# Patient Record
Sex: MALE | Race: BLACK OR AFRICAN AMERICAN | NOT HISPANIC OR LATINO | Employment: STUDENT | URBAN - METROPOLITAN AREA
[De-identification: names, ages, dates, MRNs, and addresses within clinical notes are randomized per-mention and may not be internally consistent; named-entity substitution may affect disease eponyms.]

---

## 2017-02-20 ENCOUNTER — HOSPITAL ENCOUNTER (EMERGENCY)
Facility: OTHER | Age: 37
Discharge: HOME | End: 2017-02-21
Attending: EMERGENCY MEDICINE
Payer: MEDICAID

## 2017-02-20 DIAGNOSIS — T14.90XA TRAUMA: ICD-10-CM

## 2017-02-20 DIAGNOSIS — M23.92 KNEE DERANGEMENT, LEFT: Primary | ICD-10-CM

## 2017-02-20 DIAGNOSIS — I10 ESSENTIAL HYPERTENSION: ICD-10-CM

## 2017-02-20 PROCEDURE — 99283 EMERGENCY DEPT VISIT LOW MDM: CPT | Mod: 25

## 2017-02-20 PROCEDURE — 29505 APPLICATION LONG LEG SPLINT: CPT | Mod: LT

## 2017-02-20 PROCEDURE — 25000003 PHARM REV CODE 250: Performed by: EMERGENCY MEDICINE

## 2017-02-20 RX ORDER — LISINOPRIL 10 MG/1
10 TABLET ORAL
Status: COMPLETED | OUTPATIENT
Start: 2017-02-20 | End: 2017-02-20

## 2017-02-20 RX ORDER — HYDROCODONE BITARTRATE AND ACETAMINOPHEN 7.5; 325 MG/1; MG/1
1 TABLET ORAL EVERY 8 HOURS PRN
Qty: 19 TABLET | Refills: 0 | Status: SHIPPED | OUTPATIENT
Start: 2017-02-20 | End: 2019-07-24

## 2017-02-20 RX ORDER — KETOROLAC TROMETHAMINE 30 MG/ML
30 INJECTION, SOLUTION INTRAMUSCULAR; INTRAVENOUS
Status: COMPLETED | OUTPATIENT
Start: 2017-02-20 | End: 2017-02-21

## 2017-02-20 RX ADMIN — LISINOPRIL 10 MG: 10 TABLET ORAL at 10:02

## 2017-02-20 NOTE — ED AVS SNAPSHOT
[facility] EMERGENCY DEPARTMENT  [address] [place] [address]               Nabil Alfonso   2017 10:56 PM   ED    Description:  Male : 1980   Department:  [facility]           Your Care was Coordinated By:     Provider Role From To    Ayaka Zavaleta MD Attending Provider 17 2683 --      Reason for Visit     Leg Pain           Diagnoses this Visit        Comments    Knee derangement, left    -  Primary     Trauma         Essential hypertension           ED Disposition     ED Disposition Condition Comment    Discharge             To Do List           Follow-up Information     Follow up with Orthopedic Surgery.    Why:  Contact the [facility]erral Line at [phone] for assistance in establishing care with an orthopedic surgeon.        Follow up with Hira Randhawa MD.    Specialty:  Orthopedic Surgery    Contact information:    [address]  [address]  [place] [id number] [phone]         These Medications        Disp Refills Start End    hydrocodone-acetaminophen 7.5-325mg (NORCO) 7.5-325 mg per tablet 19 tablet 0 2017     Take 1 tablet by mouth every 8 (eight) hours as needed. - Oral      [facility]     [facility] -  Assistance  Registered nurses in the [facility] provide clinical advisement, health education, appointment booking, and other advisory services.  Call for this free service at [phone].             Medications           Message regarding Medications     Verify the changes and/or additions to your medication regime listed below are the same as discussed with your clinician today.  If any of these changes or additions are incorrect, please notify your healthcare provider.        START taking these NEW medications        Refills    hydrocodone-acetaminophen 7.5-325mg (NORCO) 7.5-325 mg per tablet 0    Sig: Take 1 tablet by mouth every 8 (eight) hours as needed.    Class:  Print    Route: Oral      These medications were administered today        Dose Freq    lisinopril tablet 10 mg 10 mg ED 1 Time    Sig: Take 1 tablet (10 mg total) by mouth ED 1 Time.    Class: Normal    Route: Oral    ketorolac injection 30 mg 30 mg ED 1 Time    Sig: Inject 30 mg into the muscle ED 1 Time.    Class: Normal    Route: Intramuscular           Verify that the below list of medications is an accurate representation of the medications you are currently taking.  If none reported, the list may be blank. If incorrect, please contact your healthcare provider. Carry this list with you in case of emergency.           Current Medications     amlodipine (NORVASC) 5 MG tablet Take 5 mg by mouth once daily.    hydrocodone-acetaminophen 7.5-325mg (NORCO) 7.5-325 mg per tablet Take 1 tablet by mouth every 8 (eight) hours as needed.    ibuprofen (ADVIL,MOTRIN) 600 MG tablet Take 1 tablet (600 mg total) by mouth every 6 (six) hours as needed for Pain.    ketorolac injection 30 mg Inject 30 mg into the muscle ED 1 Time.    lisinopril (PRINIVIL,ZESTRIL) 20 MG tablet Take 20 mg by mouth once daily.    lisinopril 10 MG tablet Take 10 mg by mouth once daily.           Clinical Reference Information           Your Vitals Were     BP Pulse Temp Resp SpO2       181/106 70 97.9 °F (36.6 °C) 18 98%       Allergies as of 2/20/2017     No Known Allergies      Immunizations Administered on Date of Encounter - 2/20/2017     None      ED Micro, Lab, POCT     None      ED Imaging Orders     Start Ordered       Status Ordering Provider    02/20/17 2158 02/20/17 2157  X-Ray Knee 1 or 2 View Left  1 time imaging      Edited Result - FINAL     02/20/17 2158 02/20/17 2157  X-Ray Tibia Fibula 2 View Left  1 time imaging      Final result         Discharge Instructions       Wear knee immobilizer and use crutches until you can follow-up with orthopedic surgeon.  You can contact your primary care provider for referral to orthopedics.  You  can contact the [facility] referral line at [phone] for assistance in establishing an orthopedic surgery follow-up.  Or [facility] Dr. Randhawa, by calling [phone].  Make sure to take your blood pressure medicine every day as previously prescribed, and follow low sodium diet.    Knee Pain with Possible Torn Meniscus    The meniscus is a tough cartilage pad that cushions the inside of the knee joint. It helps absorb the shock from movement. It also spreads the weight of your body evenly across the knee joint. This prevents excess wear and tear to the bones of that joint.  The most common causes of meniscal tears are injuries, especially related to sports and degenerative disease that happens with aging.  A meniscus tear commonly happens during a twisting injury when the knee is bent. This causes pain, swelling, reduced movement of the knee, and trouble walking. There may be popping, clicking, joint locking or inability to completely straighten the knee. Ligaments of the knee may also be injured.  A torn meniscus is diagnosed by physical exam and X-rays. In the case of a severe injury, the knee may be too painful to examine fully. A more accurate exam can be done after the initial swelling goes down. An MRI may be ordered to make a final diagnosis.  If your healthcare provider suspects a meniscal injury, you will treat your knee with ice and rest and preventing movement of the knee. A splint or knee brace that keeps your leg straight may be put on to protect the joint. Depending on the severity of the injury, surgery may be needed. A cartilage injury may take 4-12 weeks to heal depending on how bad it is.  Home care  · Stay off the injured leg as much as possible until you can walk on it without pain. If you have a lot of pain when walking, crutches or a walker may be prescribed. (These can be rented or bought at many pharmacies and surgical or orthopedic  supply stores). Follow your healthcare provider's advice about when to begin putting weight on that leg.  · Keep your leg elevated to reduce pain and swelling. When sleeping, place a pillow under the injured leg. When sitting, support the injured leg so it is level with your waist. This is very important during the first 48 hours.  · Apply an ice pack over the injured area for 15 to 20 minutes every 3 to 6 hours. You should do this for the first 24 to 48 hours. You can make an ice pack by filling a plastic bag that seals at the top with ice cubes and then wrapping it with a thin towel. Continue to use ice packs for relief of pain and swelling as needed. As the ice melts, be careful to avoid getting your wrap, splint, or cast wet. After 48 hours, apply heat (warm shower or warm bath) for 15 to 20 minutes several times a day, or alternate ice and heat. You can place the ice pack directly over the splint. If you have to wear a hook-and-loop knee brace, you can open it to apply the ice pack, or heat, directly to the knee. Never put ice directly on the skin. Always wrap the ice in a towel or other type of cloth.  · You may use over-the-counter pain medicine to control pain, unless another pain medicine was prescribed. If you have chronic liver or kidney disease or ever had a stomach ulcer, talk with your healthcare provider before using these medicines.  · If you were given a splint, keep it dry at all times. Bathe with your splint out of the water. Protect it with a large plastic bag that is rubber-banded at the top end. If a fiberglass splint gets wet, you can dry it with a hair dryer. If you have a hook-and-loop knee brace, you can remove this to bathe, unless told otherwise.  · Check with your healthcare provider before returning to sports or full work duties.  Follow-up care  Follow up with your healthcare provider, or as advised. This is usually within 1-2 weeks. Further testing may be required to check the extent  of your injury.  If X-rays were taken, you will be told of any new findings that may affect your care.  Call 911  Call 911 if you have:   · Shortness of breath  · Chest pain  When to seek medical advice  Call your healthcare provider right away if any of these occur:  · Toes or foot gets swollen, cold, blue, numb, or tingly  · Pain or swelling spreads over the knee or calf  · Warmth or redness appears over the knee or calf  · Fever of 100.4°F (38°C) or above lasting for 24 to 48 hours  Date Last Reviewed: 11/23/2015  © 3518-7556 [employer]. [address] [place] [address]. All rights reserved. This information is not intended as a substitute for professional medical care. Always follow your healthcare professional's instructions.          Knee Immobilizer  A knee immobilizer is a type of brace used to provide support and limit movement of the knee. This will make you more comfortable as your injury heals.  Home care  · The knee brace should be worn whenever you are out of bed, unless told otherwise. You may wear it in bed while asleep for the first few nights or until the pain starts to go away. Otherwise, remove the brace at night to avoid muscle stiffness from lack of joint movement.  · You can open the hook-and-loop brace to dress, bathe, and apply ice or heat packs as directed.  Call 911  Call 911 if you have:  · Shortness of breath  · Chest pain  When to seek medical advice  Call your healthcare provider right away if any of these occur:  · Worsening pain in the knee  · Weakness, numbness, or tingling in the foot  · Increased swelling, redness or warmth of the knee joint  Date Last Reviewed: 11/21/2015 © 2000-2016 [employer]. [address] [place] [address]. All rights reserved. This information is not intended as a substitute for professional medical care. Always follow your healthcare professional's instructions.          [facility] Sign-Up     Activating your  [facility] account is as easy as 1-2-3!     1) Visit [email or web address], select Sign Up Now, enter this activation code and your date of birth, then select Next.  [email or web address]  Expires: 4/6/2017 11:54 PM      2) Create a username and password to use when you visit [facility] in the future and select a security question in case you lose your password and select Next.    3) Enter your e-mail address and click Sign Up!    Additional Information  If you have questions, please e-mail [email or web address] or call [phone] to talk to our [facility] staff. Remember, [facility] is NOT to be used for urgent needs. For medical emergencies, dial 911.         Smoking Cessation     If you would like to quit smoking:   You may be eligible for free services if you are a [place] resident and started smoking cigarettes before September 1, 1988.  Call the Smoking Cessation Trust ([facility]) toll free at ([phone] or ([phone] [phone].   Call 8-484-QUIT-NOW if you do not meet the above criteria.             [facility] complies with applicable Federal civil rights laws and does not discriminate on the basis of race, color, national origin, age, disability, or sex.        Language Assistance Services     ATTENTION: Language assistance services are available, free of charge. Please call [phone].      ATENCIÓN: Si habla español, tiene a medina disposición servicios gratuitos de asistencia lingüística. Llame al [phone].     CHÚ Ý: N?u b?n nói Ti?ng Vi?t, có các d?ch v? h? tr? ngôn ng? mi?n phí dành cho b?n. G?i s? [phone].

## 2017-02-21 VITALS
RESPIRATION RATE: 18 BRPM | DIASTOLIC BLOOD PRESSURE: 103 MMHG | OXYGEN SATURATION: 99 % | TEMPERATURE: 98 F | SYSTOLIC BLOOD PRESSURE: 173 MMHG | HEART RATE: 69 BPM

## 2017-02-21 PROCEDURE — 63600175 PHARM REV CODE 636 W HCPCS: Performed by: EMERGENCY MEDICINE

## 2017-02-21 PROCEDURE — 29505 APPLICATION LONG LEG SPLINT: CPT | Mod: LT

## 2017-02-21 PROCEDURE — 96372 THER/PROPH/DIAG INJ SC/IM: CPT

## 2017-02-21 RX ADMIN — KETOROLAC TROMETHAMINE 30 MG: 30 INJECTION, SOLUTION INTRAMUSCULAR at 12:02

## 2017-02-21 NOTE — ED PROVIDER NOTES
Encounter Date: 2/20/2017       History     Chief Complaint   Patient presents with    Leg Pain     L knee pain/shin s/p fall and toolbox injury. Ambulatory but with difficulty.      Review of patient's allergies indicates:  No Known Allergies  HPI Comments: 36-year-old male reports he slipped earlier today and landed on his knee.  He reports bruising and pain to the shin but mostly pain to the medial aspect of his knee and difficulty bearing weight.  Patient is noted to be significant only hypertensive.  His reports noncompliance with his lisinopril.  He denies headache, weakness, chest pain, shortness of breath, nausea,and leg swelling.    Patient is a 36 y.o. male presenting with the following complaint: leg pain. The history is provided by the patient.   Leg Pain    Pertinent negatives include no numbness.     Past Medical History   Diagnosis Date    Hypertension      Past Medical History Pertinent Negatives   Diagnosis Date Noted    Asthma 8/4/2016    Diabetes mellitus 8/4/2016     Past Surgical History   Procedure Laterality Date    Achilles tendon surgery      Hernia repair       Family History   Problem Relation Age of Onset    Hypertension Mother     Hypertension Father      Social History   Substance Use Topics    Smoking status: Former Smoker     Packs/day: 0.50     Types: Cigarettes    Smokeless tobacco: None    Alcohol use Yes      Comment: occasionally     Review of Systems   Constitutional: Negative for chills and fever.   Respiratory: Negative for cough and shortness of breath.    Cardiovascular: Negative for chest pain and palpitations.   Gastrointestinal: Negative for nausea.   Musculoskeletal: Positive for arthralgias and gait problem.   Skin: Positive for wound. Negative for color change.   Neurological: Negative for weakness, numbness and headaches.       Physical Exam   Initial Vitals   BP Pulse Resp Temp SpO2   02/20/17 2152 02/20/17 2152 02/20/17 2152 02/20/17 2152 02/20/17 2152    214/127 77 18 97.9 °F (36.6 °C) 98 %     Physical Exam    Nursing note and vitals reviewed.  Constitutional: He appears well-developed and well-nourished. He is cooperative.   HENT:   Head: Normocephalic and atraumatic.   Cardiovascular: Normal rate and regular rhythm.   No pitting edema to bilateral lower extremities   Pulmonary/Chest: Effort normal and breath sounds normal.   Abdominal: Soft. There is no tenderness.   Musculoskeletal:        Right knee: Normal.        Left knee: He exhibits decreased range of motion, swelling and effusion. He exhibits no ecchymosis. Tenderness found. MCL tenderness noted.        Legs:  Neurological: He is alert and oriented to person, place, and time. He has normal strength. No sensory deficit.   Sensation is intact bilateral lower extremities.  Strength5/5 bilateral flexors/extensors of the ankles   Skin: Skin is warm and dry. Abrasion noted.         ED Course   Procedures  Labs Reviewed - No data to display         x-rays of the tib-fib and knee were unremarkable.  Patient is placed in a knee immobilizer, given crutches, Norco for pain and advised follow-up with orthopedics possible internal derangement of the knee mainly in the medial collateral ligament injury.                    ED Course     Clinical Impression:   The primary encounter diagnosis was Knee derangement, left. Diagnoses of Trauma and Essential hypertension were also pertinent to this visit.    Disposition:   Disposition: Discharged  Condition: Stable       Ayaka Zavaleta MD  02/21/17 0314

## 2017-02-21 NOTE — DISCHARGE INSTRUCTIONS
Wear knee immobilizer and use crutches until you can follow-up with orthopedic surgeon.  You can contact your primary care provider for referral to orthopedics.  You can contact the [facility] referral line at [phone] for assistance in establishing an orthopedic surgery follow-up.  Or [facility], Dr. Randhawa, by calling [phone].  Make sure to take your blood pressure medicine every day as previously prescribed, and follow low sodium diet.    Knee Pain with Possible Torn Meniscus    The meniscus is a tough cartilage pad that cushions the inside of the knee joint. It helps absorb the shock from movement. It also spreads the weight of your body evenly across the knee joint. This prevents excess wear and tear to the bones of that joint.  The most common causes of meniscal tears are injuries, especially related to sports and degenerative disease that happens with aging.  A meniscus tear commonly happens during a twisting injury when the knee is bent. This causes pain, swelling, reduced movement of the knee, and trouble walking. There may be popping, clicking, joint locking or inability to completely straighten the knee. Ligaments of the knee may also be injured.  A torn meniscus is diagnosed by physical exam and X-rays. In the case of a severe injury, the knee may be too painful to examine fully. A more accurate exam can be done after the initial swelling goes down. An MRI may be ordered to make a final diagnosis.  If your healthcare provider suspects a meniscal injury, you will treat your knee with ice and rest and preventing movement of the knee. A splint or knee brace that keeps your leg straight may be put on to protect the joint. Depending on the severity of the injury, surgery may be needed. A cartilage injury may take 4-12 weeks to heal depending on how bad it is.  Home care  · Stay off the injured leg as much as possible until you can walk on it without  pain. If you have a lot of pain when walking, crutches or a walker may be prescribed. (These can be rented or bought at many pharmacies and surgical or orthopedic supply stores). Follow your healthcare provider's advice about when to begin putting weight on that leg.  · Keep your leg elevated to reduce pain and swelling. When sleeping, place a pillow under the injured leg. When sitting, support the injured leg so it is level with your waist. This is very important during the first 48 hours.  · Apply an ice pack over the injured area for 15 to 20 minutes every 3 to 6 hours. You should do this for the first 24 to 48 hours. You can make an ice pack by filling a plastic bag that seals at the top with ice cubes and then wrapping it with a thin towel. Continue to use ice packs for relief of pain and swelling as needed. As the ice melts, be careful to avoid getting your wrap, splint, or cast wet. After 48 hours, apply heat (warm shower or warm bath) for 15 to 20 minutes several times a day, or alternate ice and heat. You can place the ice pack directly over the splint. If you have to wear a hook-and-loop knee brace, you can open it to apply the ice pack, or heat, directly to the knee. Never put ice directly on the skin. Always wrap the ice in a towel or other type of cloth.  · You may use over-the-counter pain medicine to control pain, unless another pain medicine was prescribed. If you have chronic liver or kidney disease or ever had a stomach ulcer, talk with your healthcare provider before using these medicines.  · If you were given a splint, keep it dry at all times. Bathe with your splint out of the water. Protect it with a large plastic bag that is rubber-banded at the top end. If a fiberglass splint gets wet, you can dry it with a hair dryer. If you have a hook-and-loop knee brace, you can remove this to bathe, unless told otherwise.  · Check with your healthcare provider before returning to sports or full work  duties.  Follow-up care  Follow up with your healthcare provider, or as advised. This is usually within 1-2 weeks. Further testing may be required to check the extent of your injury.  If X-rays were taken, you will be told of any new findings that may affect your care.  Call 911  Call 911 if you have:   · Shortness of breath  · Chest pain  When to seek medical advice  Call your healthcare provider right away if any of these occur:  · Toes or foot gets swollen, cold, blue, numb, or tingly  · Pain or swelling spreads over the knee or calf  · Warmth or redness appears over the knee or calf  · Fever of 100.4°F (38°C) or above lasting for 24 to 48 hours  Date Last Reviewed: 11/23/2015 © 2000-2016 [employer]. [address] [place] [address]. All rights reserved. This information is not intended as a substitute for professional medical care. Always follow your healthcare professional's instructions.          Knee Immobilizer  A knee immobilizer is a type of brace used to provide support and limit movement of the knee. This will make you more comfortable as your injury heals.  Home care  · The knee brace should be worn whenever you are out of bed, unless told otherwise. You may wear it in bed while asleep for the first few nights or until the pain starts to go away. Otherwise, remove the brace at night to avoid muscle stiffness from lack of joint movement.  · You can open the hook-and-loop brace to dress, bathe, and apply ice or heat packs as directed.  Call 911  Call 911 if you have:  · Shortness of breath  · Chest pain  When to seek medical advice  Call your healthcare provider right away if any of these occur:  · Worsening pain in the knee  · Weakness, numbness, or tingling in the foot  · Increased swelling, redness or warmth of the knee joint  Date Last Reviewed: 11/21/2015 © 2000-2016 [employer]. [address] [place] [address]. All rights reserved. This information  is not intended as a substitute for professional medical care. Always follow your healthcare professional's instructions.

## 2017-09-21 ENCOUNTER — OFFICE VISIT (OUTPATIENT)
Dept: URGENT CARE | Facility: CLINIC | Age: 37
End: 2017-09-21
Payer: MEDICAID

## 2017-09-21 VITALS
HEART RATE: 71 BPM | TEMPERATURE: 98 F | DIASTOLIC BLOOD PRESSURE: 96 MMHG | HEIGHT: 67 IN | OXYGEN SATURATION: 99 % | SYSTOLIC BLOOD PRESSURE: 151 MMHG | BODY MASS INDEX: 31.39 KG/M2 | WEIGHT: 200 LBS

## 2017-09-21 DIAGNOSIS — J01.90 ACUTE NON-RECURRENT SINUSITIS, UNSPECIFIED LOCATION: Primary | ICD-10-CM

## 2017-09-21 DIAGNOSIS — R50.9 FEVER, UNSPECIFIED FEVER CAUSE: ICD-10-CM

## 2017-09-21 LAB
CTP QC/QA: YES
FLUAV AG NPH QL: NEGATIVE
FLUBV AG NPH QL: NEGATIVE

## 2017-09-21 PROCEDURE — 87804 INFLUENZA ASSAY W/OPTIC: CPT | Mod: QW,S$GLB,, | Performed by: FAMILY MEDICINE

## 2017-09-21 PROCEDURE — 3080F DIAST BP >= 90 MM HG: CPT | Mod: S$GLB,,, | Performed by: FAMILY MEDICINE

## 2017-09-21 PROCEDURE — 3008F BODY MASS INDEX DOCD: CPT | Mod: S$GLB,,, | Performed by: FAMILY MEDICINE

## 2017-09-21 PROCEDURE — 3077F SYST BP >= 140 MM HG: CPT | Mod: S$GLB,,, | Performed by: FAMILY MEDICINE

## 2017-09-21 PROCEDURE — 99214 OFFICE O/P EST MOD 30 MIN: CPT | Mod: 25,S$GLB,, | Performed by: FAMILY MEDICINE

## 2017-09-21 RX ORDER — AZITHROMYCIN 250 MG/1
250 TABLET, FILM COATED ORAL DAILY
Qty: 6 TABLET | Refills: 0 | Status: SHIPPED | OUTPATIENT
Start: 2017-09-21 | End: 2019-07-24

## 2017-09-21 RX ORDER — LISINOPRIL AND HYDROCHLOROTHIAZIDE 12.5; 2 MG/1; MG/1
TABLET ORAL
COMMUNITY
Start: 2017-08-21 | End: 2019-07-24

## 2017-09-21 RX ORDER — CODEINE PHOSPHATE AND GUAIFENESIN 10; 100 MG/5ML; MG/5ML
10 SOLUTION ORAL NIGHTLY PRN
Qty: 120 ML | Refills: 0 | Status: SHIPPED | OUTPATIENT
Start: 2017-09-21 | End: 2017-10-05

## 2017-09-21 RX ORDER — DEXAMETHASONE SODIUM PHOSPHATE 100 MG/10ML
7 INJECTION INTRAMUSCULAR; INTRAVENOUS ONCE
Status: COMPLETED | OUTPATIENT
Start: 2017-09-21 | End: 2017-09-21

## 2017-09-21 RX ORDER — HYDROCODONE BITARTRATE AND ACETAMINOPHEN 5; 325 MG/1; MG/1
TABLET ORAL
COMMUNITY
Start: 2017-09-02 | End: 2019-07-24

## 2017-09-21 RX ADMIN — DEXAMETHASONE SODIUM PHOSPHATE 7 MG: 100 INJECTION INTRAMUSCULAR; INTRAVENOUS at 09:09

## 2017-09-21 NOTE — LETTER
September 21, 2017      [facility] [address], [address]  [place] [address]  Phone: [phone]  Fax: [phone]       Patient: Nabil Alfonso   YOB: 1980  Date of Visit: 09/21/2017    To Whom It May Concern:    Maite Alfonso  was at [facility] on 09/21/2017. He may return to work/school on 9/22/17 with no restrictions. If you have any questions or concerns, or if I can be of further assistance, please do not hesitate to contact me.    Sincerely,    Woody Hogan MD

## 2017-09-21 NOTE — PATIENT INSTRUCTIONS
Sinusitis (Antibiotic Treatment)    The sinuses are air-filled spaces within the bones of the face. They connect to the inside of the nose. Sinusitis is an inflammation of the tissue lining the sinus cavity. Sinus inflammation can occur during a cold. It can also be due to allergies to pollens and other particles in the air. Sinusitis can cause symptoms of sinus congestion and fullness. A sinus infection causes fever, headache and facial pain. There is often green or yellow drainage from the nose or into the back of the throat (post-nasal drip). You have been given antibiotics to treat this condition.  Home care:  · Take the full course of antibiotics as instructed. Do not stop taking them, even if you feel better.  · Drink plenty of water, hot tea, and other liquids. This may help thin mucus. It also may promote sinus drainage.  · Heat may help soothe painful areas of the face. Use a towel soaked in hot water. Or,  the shower and direct the hot spray onto your face. Using a vaporizer along with a menthol rub at night may also help.   · An expectorant containing guaifenesin may help thin the mucus and promote drainage from the sinuses.  · Over-the-counter decongestants may be used unless a similar medicine was prescribed. Nasal sprays work the fastest. Use one that contains phenylephrine or oxymetazoline. First blow the nose gently. Then use the spray. Do not use these medicines more often than directed on the label or symptoms may get worse. You may also use tablets containing pseudoephedrine. Avoid products that combine ingredients, because side effects may be increased. Read labels. You can also ask the pharmacist for help. (NOTE: Persons with high blood pressure should not use decongestants. They can raise blood pressure.)  · Over-the-counter antihistamines may help if allergies contributed to your sinusitis.    · Do not use nasal rinses or irrigation during an acute sinus infection, unless told to by  your health care provider. Rinsing may spread the infection to other sinuses.  · Use acetaminophen or ibuprofen to control pain, unless another pain medicine was prescribed. (If you have chronic liver or kidney disease or ever had a stomach ulcer, talk with your doctor before using these medicines. Aspirin should never be used in anyone under 18 years of age who is ill with a fever. It may cause severe liver damage.)  · Don't smoke. This can worsen symptoms.  Follow-up care  Follow up with your healthcare provider or our staff if you are not improving within the next week.  When to seek medical advice  Call your healthcare provider if any of these occur:  · Facial pain or headache becoming more severe  · Stiff neck  · Unusual drowsiness or confusion  · Swelling of the forehead or eyelids  · Vision problems, including blurred or double vision  · Fever of 100.4ºF (38ºC) or higher, or as directed by your healthcare provider  · Seizure  · Breathing problems  · Symptoms not resolving within 10 days  Date Last Reviewed: 4/13/2015  © 2339-8035 [employer]. [address] [place] [address], [place] [address]. All rights reserved. This information is not intended as a substitute for professional medical care. Always follow your healthcare professional's instructions.

## 2017-09-21 NOTE — PROGRESS NOTES
"Subjective:       Patient ID: Nabil Alfonso is a 37 y.o. male.    Vitals:  height is 5' 7" (1.702 m) and weight is 90.7 kg (200 lb). His oral temperature is 97.8 °F (36.6 °C). His blood pressure is 151/96 (abnormal) and his pulse is 71. His oxygen saturation is 99%.     Chief Complaint: Cough and Emesis    URI    This is a new problem. The current episode started in the past 7 days. The problem has been unchanged. There has been no fever. Associated symptoms include coughing, diarrhea, a sore throat and vomiting. Pertinent negatives include no abdominal pain, chest pain, dysuria or nausea.     Review of Systems   Constitution: Negative for chills and fever.   HENT: Positive for sore throat.    Cardiovascular: Negative for chest pain.   Respiratory: Positive for cough. Negative for shortness of breath.    Musculoskeletal: Negative for back pain.   Gastrointestinal: Positive for diarrhea and vomiting. Negative for abdominal pain, constipation, hematochezia, melena and nausea.   Genitourinary: Negative for dysuria.       Objective:      Physical Exam   Constitutional: He is oriented to person, place, and time. He appears well-developed and well-nourished. He appears ill.   HENT:   Head: Normocephalic and atraumatic.   Nose: Mucosal edema, rhinorrhea and sinus tenderness present. Right sinus exhibits maxillary sinus tenderness and frontal sinus tenderness.   Mouth/Throat: Posterior oropharyngeal edema and posterior oropharyngeal erythema present.   Eyes: Conjunctivae and EOM are normal. Pupils are equal, round, and reactive to light.   Neck: Normal range of motion.   Cardiovascular: Normal rate, regular rhythm and normal heart sounds.    Pulmonary/Chest: Effort normal and breath sounds normal. He has no wheezes. He has no rales.   Neurological: He is alert and oriented to person, place, and time.       Assessment:       1. Acute non-recurrent sinusitis, unspecified location    2. Fever, unspecified fever cause      "   Plan:         Acute non-recurrent sinusitis, unspecified location  -     dexamethasone injection 7 mg; Inject 0.7 mLs (7 mg total) into the muscle once.  -     azithromycin (Z-PENELOPE) 250 MG tablet; Take 1 tablet (250 mg total) by mouth once daily. Double dose on day #1  Dispense: 6 tablet; Refill: 0  -     guaifenesin-codeine 100-10 mg/5 ml (CHERATUSSIN AC)  mg/5 mL syrup; Take 10 mLs by mouth nightly as needed for Cough.  Dispense: 120 mL; Refill: 0    Fever, unspecified fever cause  -     POCT Influenza A/B

## 2018-10-15 ENCOUNTER — OFFICE VISIT (OUTPATIENT)
Dept: URGENT CARE | Facility: CLINIC | Age: 38
End: 2018-10-15
Payer: MEDICAID

## 2018-10-15 VITALS
HEIGHT: 67 IN | WEIGHT: 199 LBS | RESPIRATION RATE: 16 BRPM | OXYGEN SATURATION: 99 % | DIASTOLIC BLOOD PRESSURE: 102 MMHG | HEART RATE: 57 BPM | SYSTOLIC BLOOD PRESSURE: 172 MMHG | TEMPERATURE: 98 F | BODY MASS INDEX: 31.23 KG/M2

## 2018-10-15 DIAGNOSIS — I10 ESSENTIAL HYPERTENSION: ICD-10-CM

## 2018-10-15 DIAGNOSIS — M79.672 ACUTE PAIN OF LEFT FOOT: ICD-10-CM

## 2018-10-15 DIAGNOSIS — M72.2 PLANTAR FASCIITIS, LEFT: ICD-10-CM

## 2018-10-15 DIAGNOSIS — M77.32 HEEL SPUR, LEFT: Primary | ICD-10-CM

## 2018-10-15 PROCEDURE — 73630 X-RAY EXAM OF FOOT: CPT | Mod: LT,S$GLB,, | Performed by: RADIOLOGY

## 2018-10-15 PROCEDURE — 99214 OFFICE O/P EST MOD 30 MIN: CPT | Mod: S$GLB,,, | Performed by: NURSE PRACTITIONER

## 2018-10-15 RX ORDER — AMLODIPINE BESYLATE 10 MG/1
TABLET ORAL
Refills: 6 | COMMUNITY
Start: 2018-09-17 | End: 2019-07-24

## 2018-10-15 RX ORDER — IBUPROFEN 600 MG/1
600 TABLET ORAL EVERY 6 HOURS PRN
Qty: 60 TABLET | Refills: 2 | Status: SHIPPED | OUTPATIENT
Start: 2018-10-15 | End: 2019-07-24

## 2018-10-15 RX ORDER — MUPIROCIN 20 MG/G
OINTMENT TOPICAL
Refills: 0 | COMMUNITY
Start: 2018-09-18 | End: 2019-07-24

## 2018-10-15 NOTE — PATIENT INSTRUCTIONS
Please drink plenty of fluids.  Please get plenty of rest.  Please return here or go to the Emergency Department for any concerns or worsening of condition.  If you were prescribed a narcotic medication, do not drive or operate heavy equipment or machinery while taking these medications.  If you were not prescribed an anti-inflammatory medication, and if you do not have any history of stomach/intestinal ulcers, or kidney disease, or are not taking a blood thinner such as Coumadin, Plavix, Pradaxa, Eloquis, or Xaralta for example, it is OK to take over the counter Ibuprofen or Advil or Motrin or Aleve as directed.  Do not take these medications on an empty stomach.  Rest, ice, compression and elevation to the affected joint or limb as needed.  Please follow up with your primary care doctor or specialist as needed.    If you  smoke, please stop smoking.    Heel Spurs    The plantar fascia is a thick, fibrous layer of tissue that covers the bones on the bottom of your foot. It holds the foot bones in an arched position. Plantar fasciitis is a painful swelling of the plantar fascia.  A heel spur is an overgrowth of bone where the plantar fascia attaches to the heel bone. The heel spur itself usually doesnt cause pain. However, the heel spur might be a sign of plantar fasciitis which may cause your foot pain. There is no specific treatment for heel spurs.   Plantar fasciitis can develop slowly or suddenly. It usually affects one foot at a time. Heel pain can feel sharp, like a knife sticking into the bottom of your foot. You may feel pain after exercising, long-distance jogging, stair climbing, long periods of standing, or after standing up.  Risk factors for plantar fasciitis include: arthritis, diabetes, obesity or recent weight gain, flat foot, and having high arches. Wearing high heels, loose shoes, or shoes with poor arch support adds to the risk.    Foot pain is usually worse in the morning. But it improves with  walking. By the end of the day there may be a dull aching. Treatment includes short-term rest and controlling inflammation. It may take up to 9 months before all symptoms go away. In rare cases, a steroid injection in the foot, or surgery, may be needed.  Home care  · If you are overweight, lose weight to help healing.  · Choose supportive shoes with good arch support and shock absorbency. Replace athletic shoes when they become worn out. Dont walk or run barefoot.  · Premade or custom-fitted shoe inserts may be helpful. Inserts made of silicone seem to be the most effective. Custom-made inserts can be provided by a podiatrist or foot specialist, physical therapist, or orthopedist.  · Premade or custom-made night splints keep the heel stretched out while you sleep. They may prevent morning pain.  · Avoid activities that stress the feet: jogging, prolonged standing or walking, contact sports, etc.  · First thing in the morning and before sports, stretch the bottom of your foot. Gently flex your ankle so the toes move toward your knee.  · Icing may help control heel pain. Apply an ice pack to the heel for 10-20 minutes as a preventive. Or ice your heel after a severe flare-up of symptoms. You may repeat this every 1-2 hours as needed.  · You may use over-the-counter pain medicine to control pain, unless another medicine was prescribed. Anti-inflammatory pain medicines, such as ibuprofen or naproxen, may work better than acetaminophen. If you have chronic liver or kidney disease or ever had a stomach ulcer or GI bleeding, talk with your healthcare provider before using these medicines.  · Shoe inserts, a night splint, or a special boot may be needed. Use these as directed by your healthcare provider.  Follow-up care   Follow up with your healthcare provider, physical therapist, or podiatrist or foot specialist as advised.  When to seek medical advice  Call your healthcare provider right away if any of these  occur:  · The pain worsens.  · There is no relief after a few weeks of home treatment.   Date Last Reviewed: 11/23/2015  © 6179-4454 [employer]. [address] [place] [address], [place] [address]. All rights reserved. This information is not intended as a substitute for professional medical care. Always follow your healthcare professional's instructions.        Treating Plantar Fasciitis  First, your healthcare provider tries to determine the cause of your problem in order to suggest ways to relieve pain. If your pain is due to poor foot mechanics, custom-made shoe inserts (orthoses) may help.    Reduce symptoms  · To relieve mild symptoms, try aspirin, ibuprofen, or other medicines as directed. Rubbing ice on the affected area may also help.  · To reduce severe pain and swelling, your healthcare provider may prescribe pills or injections or a walking cast in some instances. Physical therapy, such as ultrasound or a daily stretching program, may also be recommended. Surgery is rarely required.  · To reduce symptoms caused by poor foot mechanics, your foot may be taped. This supports the arch and temporarily controls movement. Night splints may also help by stretching the fascia.  Control movement  If taping helps, your healthcare provider may prescribe orthoses. Built from plaster casts of your feet, these inserts control the way your foot moves. As a result, your symptoms should go away.  Reduce overuse  Every time your foot strikes the ground, the plantar fascia is stretched. You can reduce the strain on the plantar fascia and the possibility of overuse by following these suggestions:  · Lose any excess weight.  · Avoid running on hard or uneven ground.  · Use orthoses at all times in your shoes and house slippers.  If surgery is needed  Your healthcare provider may consider surgery if other types of treatment don't control your pain. During surgery, the plantar fascia is partially cut to release tension. As  you heal, fibrous tissue fills the space between the heel bone and the plantar fascia.   Date Last Reviewed: 10/14/2015  © 9621-4018 [employer]. [address] [place] [address], [place] [address]. All rights reserved. This information is not intended as a substitute for professional medical care. Always follow your healthcare professional's instructions.

## 2018-10-15 NOTE — PROGRESS NOTES
"Subjective:       Patient ID: Nabil Alfonso is a 38 y.o. male.    Vitals:  height is 5' 7" (1.702 m) and weight is 90.3 kg (199 lb). His temperature is 97.5 °F (36.4 °C). His blood pressure is 172/102 (abnormal) and his pulse is 57 (abnormal). His respiration is 16 and oxygen saturation is 99%.     Chief Complaint: Foot Pain    States he was playing football yesterday when he felt pain in his left foot. Was wearing [employer] cleats while playing. Has a prior h/o a bilat Achilles surgery from rupture. Took ibuprofen this am and forgot to take his BP meds.       Foot Pain   This is a new problem. The current episode started yesterday. The problem occurs constantly. The problem has been gradually worsening. Associated symptoms include joint swelling. Pertinent negatives include no abdominal pain, chest pain, neck pain, numbness or weakness. The symptoms are aggravated by walking and standing. He has tried NSAIDs for the symptoms. The treatment provided moderate relief.     Review of Systems   Constitution: Negative for weakness and malaise/fatigue.   HENT: Negative for nosebleeds.    Cardiovascular: Negative for chest pain and syncope.   Respiratory: Negative for shortness of breath.    Musculoskeletal: Positive for joint pain and joint swelling. Negative for back pain and neck pain.        Foot pain   Gastrointestinal: Negative for abdominal pain.   Genitourinary: Negative for hematuria.   Neurological: Negative for dizziness and numbness.   All other systems reviewed and are negative.      Objective:      Physical Exam   Constitutional: He is oriented to person, place, and time. He appears well-developed and well-nourished. He is cooperative.  Non-toxic appearance. He does not appear ill. No distress.   Hypertensive.  Forgot to take his meds this morning.  States he will take them as soon as he gets home.   HENT:   Head: Normocephalic and atraumatic. Head is without abrasion, without contusion and without laceration. "   Right Ear: Hearing, tympanic membrane, external ear and ear canal normal. No hemotympanum.   Left Ear: Hearing, tympanic membrane, external ear and ear canal normal. No hemotympanum.   Nose: Nose normal. No mucosal edema, rhinorrhea or nasal deformity. No epistaxis. Right sinus exhibits no maxillary sinus tenderness and no frontal sinus tenderness. Left sinus exhibits no maxillary sinus tenderness and no frontal sinus tenderness.   Mouth/Throat: Uvula is midline and mucous membranes are normal. No trismus in the jaw. Normal dentition. No uvula swelling. No posterior oropharyngeal erythema.   Eyes: Conjunctivae and lids are normal. Right eye exhibits no discharge. Left eye exhibits no discharge. No scleral icterus.   Sclera clear bilat   Neck: Trachea normal, normal range of motion, full passive range of motion without pain and phonation normal. Neck supple. No spinous process tenderness and no muscular tenderness present. No neck rigidity. No tracheal deviation present.   Cardiovascular: Normal rate, intact distal pulses and normal pulses.   Pulses:       Dorsalis pedis pulses are 2+ on the right side, and 2+ on the left side.        Posterior tibial pulses are 2+ on the right side, and 2+ on the left side.   Pulmonary/Chest: Effort normal. No respiratory distress.   Abdominal: Normal appearance. He exhibits no pulsatile midline mass.   Musculoskeletal: Normal range of motion. He exhibits tenderness. He exhibits no edema or deformity.        Left foot: There is tenderness. There is normal range of motion, no swelling and no deformity.        Feet:    Feet:   Left Foot:   Skin Integrity: Negative for ulcer, blister, skin breakdown, erythema, warmth, callus or dry skin.   Neurological: He is alert and oriented to person, place, and time. He has normal strength. He displays normal reflexes. No cranial nerve deficit or sensory deficit. He exhibits normal muscle tone. He displays no seizure activity. Coordination  normal. GCS eye subscore is 4. GCS verbal subscore is 5. GCS motor subscore is 6.   Skin: Skin is warm, dry and intact. Capillary refill takes less than 2 seconds. No abrasion, no bruising, no burn, no ecchymosis and no laceration noted. He is not diaphoretic. No pallor.   Psychiatric: He has a normal mood and affect. His speech is normal and behavior is normal. Judgment and thought content normal. Cognition and memory are normal.   Nursing note and vitals reviewed.      EXAMINATION:  XR FOOT COMPLETE 3 VIEW LEFT    CLINICAL HISTORY:  .  Pain in left foot    TECHNIQUE:  AP, lateral and oblique views of the left foot were performed.    COMPARISON:  None.    FINDINGS:  The bones are intact. There is no evidence for acute fracture or bone destruction.  There is no evidence for dislocation. No bony erosions are identified. There are small calcaneal spurs at the insertions of the Achilles tendon and plantar fascia.  Soft tissues are unremarkable.      Impression       No evidence for acute fracture, bone destruction, or dislocation.    Calcaneal spurs.       Assessment:       1. Heel spur, left    2. Plantar fasciitis, left    3. Acute pain of left foot    4. Essential hypertension        Plan:         Heel spur, left  -     Ambulatory referral to Podiatry    Plantar fasciitis, left  -     Ambulatory referral to Podiatry    Acute pain of left foot  -     X-Ray Foot Complete 3 view Left; Future; Expected date: 10/15/2018    Essential hypertension    Other orders  -     ibuprofen (ADVIL,MOTRIN) 600 MG tablet; Take 1 tablet (600 mg total) by mouth every 6 (six) hours as needed for Pain.  Dispense: 60 tablet; Refill: 2      Patient Instructions       Please drink plenty of fluids.  Please get plenty of rest.  Please return here or go to the Emergency Department for any concerns or worsening of condition.  If you were prescribed a narcotic medication, do not drive or operate heavy equipment or machinery while taking these  medications.  If you were not prescribed an anti-inflammatory medication, and if you do not have any history of stomach/intestinal ulcers, or kidney disease, or are not taking a blood thinner such as Coumadin, Plavix, Pradaxa, Eloquis, or Xaralta for example, it is OK to take over the counter Ibuprofen or Advil or Motrin or Aleve as directed.  Do not take these medications on an empty stomach.  Rest, ice, compression and elevation to the affected joint or limb as needed.  Please follow up with your primary care doctor or specialist as needed.    If you  smoke, please stop smoking.    Heel Spurs    The plantar fascia is a thick, fibrous layer of tissue that covers the bones on the bottom of your foot. It holds the foot bones in an arched position. Plantar fasciitis is a painful swelling of the plantar fascia.  A heel spur is an overgrowth of bone where the plantar fascia attaches to the heel bone. The heel spur itself usually doesnt cause pain. However, the heel spur might be a sign of plantar fasciitis which may cause your foot pain. There is no specific treatment for heel spurs.   Plantar fasciitis can develop slowly or suddenly. It usually affects one foot at a time. Heel pain can feel sharp, like a knife sticking into the bottom of your foot. You may feel pain after exercising, long-distance jogging, stair climbing, long periods of standing, or after standing up.  Risk factors for plantar fasciitis include: arthritis, diabetes, obesity or recent weight gain, flat foot, and having high arches. Wearing high heels, loose shoes, or shoes with poor arch support adds to the risk.    Foot pain is usually worse in the morning. But it improves with walking. By the end of the day there may be a dull aching. Treatment includes short-term rest and controlling inflammation. It may take up to 9 months before all symptoms go away. In rare cases, a steroid injection in the foot, or surgery, may be needed.  Home care  · If you  are overweight, lose weight to help healing.  · Choose supportive shoes with good arch support and shock absorbency. Replace athletic shoes when they become worn out. Dont walk or run barefoot.  · Premade or custom-fitted shoe inserts may be helpful. Inserts made of silicone seem to be the most effective. Custom-made inserts can be provided by a podiatrist or foot specialist, physical therapist, or orthopedist.  · Premade or custom-made night splints keep the heel stretched out while you sleep. They may prevent morning pain.  · Avoid activities that stress the feet: jogging, prolonged standing or walking, contact sports, etc.  · First thing in the morning and before sports, stretch the bottom of your foot. Gently flex your ankle so the toes move toward your knee.  · Icing may help control heel pain. Apply an ice pack to the heel for 10-20 minutes as a preventive. Or ice your heel after a severe flare-up of symptoms. You may repeat this every 1-2 hours as needed.  · You may use over-the-counter pain medicine to control pain, unless another medicine was prescribed. Anti-inflammatory pain medicines, such as ibuprofen or naproxen, may work better than acetaminophen. If you have chronic liver or kidney disease or ever had a stomach ulcer or GI bleeding, talk with your healthcare provider before using these medicines.  · Shoe inserts, a night splint, or a special boot may be needed. Use these as directed by your healthcare provider.  Follow-up care   Follow up with your healthcare provider, physical therapist, or podiatrist or foot specialist as advised.  When to seek medical advice  Call your healthcare provider right away if any of these occur:  · The pain worsens.  · There is no relief after a few weeks of home treatment.   Date Last Reviewed: 11/23/2015  © 1742-3428 [facility]. [address] [place] [address], [place] [address]. All rights reserved. This information is not intended as a substitute for  professional medical care. Always follow your healthcare professional's instructions.        Treating Plantar Fasciitis  First, your healthcare provider tries to determine the cause of your problem in order to suggest ways to relieve pain. If your pain is due to poor foot mechanics, custom-made shoe inserts (orthoses) may help.    Reduce symptoms  · To relieve mild symptoms, try aspirin, ibuprofen, or other medicines as directed. Rubbing ice on the affected area may also help.  · To reduce severe pain and swelling, your healthcare provider may prescribe pills or injections or a walking cast in some instances. Physical therapy, such as ultrasound or a daily stretching program, may also be recommended. Surgery is rarely required.  · To reduce symptoms caused by poor foot mechanics, your foot may be taped. This supports the arch and temporarily controls movement. Night splints may also help by stretching the fascia.  Control movement  If taping helps, your healthcare provider may prescribe orthoses. Built from plaster casts of your feet, these inserts control the way your foot moves. As a result, your symptoms should go away.  Reduce overuse  Every time your foot strikes the ground, the plantar fascia is stretched. You can reduce the strain on the plantar fascia and the possibility of overuse by following these suggestions:  · Lose any excess weight.  · Avoid running on hard or uneven ground.  · Use orthoses at all times in your shoes and house slippers.  If surgery is needed  Your healthcare provider may consider surgery if other types of treatment don't control your pain. During surgery, the plantar fascia is partially cut to release tension. As you heal, fibrous tissue fills the space between the heel bone and the plantar fascia.   Date Last Reviewed: 10/14/2015  © 6887-0328 [employer]. [address] [place] [address], [place] [address]. All rights reserved. This information is not intended as a substitute for  professional medical care. Always follow your healthcare professional's instructions.

## 2018-10-15 NOTE — LETTER
October 15, 2018      [facility] [address], [address]  [place] [address]  Phone: [phone]  Fax: [phone]       Patient: Nabil Alfonso   YOB: 1980  Date of Visit: 10/15/2018    To Whom It May Concern:    Maite Alfonso  was at [facility] on 10/15/2018. He may return to work/school on 10/18/18 with no restrictions. If you have any questions or concerns, or if I can be of further assistance, please do not hesitate to contact me.    Sincerely,    Dee Woods, NP

## 2018-10-19 ENCOUNTER — TELEPHONE (OUTPATIENT)
Dept: FAMILY MEDICINE | Facility: CLINIC | Age: 38
End: 2018-10-19

## 2018-10-19 NOTE — TELEPHONE ENCOUNTER
Spoke with pt regarding his Podiatry referral and he will call to schedule his appt with Dr.Leah Shelton at [facility].

## 2018-11-14 ENCOUNTER — OFFICE VISIT (OUTPATIENT)
Dept: URGENT CARE | Facility: CLINIC | Age: 38
End: 2018-11-14
Payer: MEDICAID

## 2018-11-14 VITALS
HEIGHT: 67 IN | WEIGHT: 199 LBS | OXYGEN SATURATION: 98 % | DIASTOLIC BLOOD PRESSURE: 90 MMHG | SYSTOLIC BLOOD PRESSURE: 150 MMHG | BODY MASS INDEX: 31.23 KG/M2 | TEMPERATURE: 97 F | HEART RATE: 80 BPM

## 2018-11-14 DIAGNOSIS — J06.9 VIRAL URI: Primary | ICD-10-CM

## 2018-11-14 PROCEDURE — 99214 OFFICE O/P EST MOD 30 MIN: CPT | Mod: S$GLB,,, | Performed by: NURSE PRACTITIONER

## 2018-11-14 RX ORDER — PROMETHAZINE HYDROCHLORIDE AND DEXTROMETHORPHAN HYDROBROMIDE 6.25; 15 MG/5ML; MG/5ML
5 SYRUP ORAL NIGHTLY PRN
Qty: 120 ML | Refills: 0 | Status: SHIPPED | OUTPATIENT
Start: 2018-11-14 | End: 2018-11-24

## 2018-11-14 RX ORDER — FLUTICASONE PROPIONATE 50 MCG
1 SPRAY, SUSPENSION (ML) NASAL 2 TIMES DAILY
Qty: 1 BOTTLE | Refills: 0 | Status: SHIPPED | OUTPATIENT
Start: 2018-11-14 | End: 2019-07-24

## 2018-11-14 RX ORDER — CETIRIZINE HYDROCHLORIDE 10 MG/1
10 TABLET ORAL DAILY
Qty: 30 TABLET | Refills: 0 | Status: SHIPPED | OUTPATIENT
Start: 2018-11-14 | End: 2023-01-31

## 2018-11-14 RX ORDER — ALBUTEROL SULFATE 90 UG/1
2 AEROSOL, METERED RESPIRATORY (INHALATION) EVERY 6 HOURS PRN
Qty: 18 G | Refills: 0 | Status: SHIPPED | OUTPATIENT
Start: 2018-11-14 | End: 2023-01-31

## 2018-11-14 RX ORDER — DEXAMETHASONE SODIUM PHOSPHATE 100 MG/10ML
10 INJECTION INTRAMUSCULAR; INTRAVENOUS
Status: COMPLETED | OUTPATIENT
Start: 2018-11-14 | End: 2018-11-14

## 2018-11-14 RX ADMIN — DEXAMETHASONE SODIUM PHOSPHATE 10 MG: 100 INJECTION INTRAMUSCULAR; INTRAVENOUS at 06:11

## 2018-11-15 NOTE — PROGRESS NOTES
"Subjective:       Patient ID: Nabil Alfonso is a 38 y.o. male.    Vitals:  height is 5' 7" (1.702 m) and weight is 90.3 kg (199 lb). His temperature is 97.2 °F (36.2 °C). His blood pressure is 150/90 (abnormal) and his pulse is 80. His oxygen saturation is 98%.     Chief Complaint: Sinus Problem    Pt reports having cough and congestion since last night. Pt has not taken anything OTC.       Sinus Problem   This is a new problem. The current episode started yesterday. There has been no fever. Associated symptoms include congestion, coughing, headaches, sinus pressure, sneezing and a sore throat. Pertinent negatives include no chills or shortness of breath. Past treatments include nothing.     Review of Systems   Constitution: Negative for chills and fever.   HENT: Positive for congestion, sinus pressure, sneezing and sore throat.    Eyes: Negative for blurred vision.   Cardiovascular: Negative for chest pain.   Respiratory: Positive for cough and sputum production. Negative for shortness of breath.    Skin: Negative for rash.   Musculoskeletal: Negative for back pain and joint pain.   Gastrointestinal: Negative for abdominal pain, diarrhea, nausea and vomiting.   Neurological: Positive for headaches.   Psychiatric/Behavioral: The patient is not nervous/anxious.        Objective:      Physical Exam   Constitutional: He is oriented to person, place, and time. He appears well-developed and well-nourished. He is cooperative.  Non-toxic appearance. He does not appear ill. No distress.   HENT:   Head: Normocephalic and atraumatic.   Right Ear: Hearing, external ear and ear canal normal. A middle ear effusion is present.   Left Ear: Hearing, external ear and ear canal normal. A middle ear effusion is present.   Nose: Mucosal edema and rhinorrhea present. No nasal deformity. No epistaxis. Right sinus exhibits no maxillary sinus tenderness and no frontal sinus tenderness. Left sinus exhibits no maxillary sinus tenderness and " no frontal sinus tenderness.   Mouth/Throat: Uvula is midline, oropharynx is clear and moist and mucous membranes are normal. No trismus in the jaw. Normal dentition. No uvula swelling. No oropharyngeal exudate, posterior oropharyngeal edema or posterior oropharyngeal erythema.   Eyes: Conjunctivae and lids are normal. No scleral icterus.   Sclera clear bilat   Neck: Trachea normal, full passive range of motion without pain and phonation normal. Neck supple.   Cardiovascular: Normal rate, regular rhythm, normal heart sounds, intact distal pulses and normal pulses.   Pulmonary/Chest: Effort normal and breath sounds normal. No stridor. No respiratory distress. He has no decreased breath sounds. He has no wheezes.   Abdominal: Soft. Normal appearance and bowel sounds are normal. He exhibits no distension. There is no tenderness.   Musculoskeletal: Normal range of motion. He exhibits no edema or deformity.   Neurological: He is alert and oriented to person, place, and time. He exhibits normal muscle tone. Coordination normal.   Skin: Skin is warm, dry and intact. He is not diaphoretic. No pallor.   Psychiatric: He has a normal mood and affect. His speech is normal and behavior is normal. Judgment and thought content normal. Cognition and memory are normal.   Nursing note and vitals reviewed.      Assessment:       1. Viral URI        Plan:         Viral URI  -     dexamethasone injection 10 mg  -     albuterol (PROVENTIL/VENTOLIN HFA) 90 mcg/actuation inhaler; Inhale 2 puffs into the lungs every 6 (six) hours as needed. Rescue  Dispense: 18 g; Refill: 0  -     promethazine-dextromethorphan (PROMETHAZINE-DM) 6.25-15 mg/5 mL Syrp; Take 5 mLs by mouth nightly as needed.  Dispense: 120 mL; Refill: 0  -     fluticasone (FLONASE) 50 mcg/actuation nasal spray; 1 spray (50 mcg total) by Each Nare route 2 (two) times daily.  Dispense: 1 Bottle; Refill: 0  -     cetirizine (ZYRTEC) 10 MG tablet; Take 1 tablet (10 mg total) by  mouth once daily.  Dispense: 30 tablet; Refill: 0      Patient Instructions       COUGH SYRUP WILL MAKE YOU DROWSY- ONLY TAKE IT AT NIGHT    Viral Upper Respiratory Illness (Adult)  You have a viral upper respiratory illness (URI), which is another term for the common cold. This illness is contagious during the first few days. It is spread through the air by coughing and sneezing. It may also be spread by direct contact (touching the sick person and then touching your own eyes, nose, or mouth). Frequent handwashing will decrease risk of spread. Most viral illnesses go away within 7 to 10 days with rest and simple home remedies. Sometimes the illness may last for several weeks. Antibiotics will not kill a virus, and they are generally not prescribed for this condition.    Home care  · If symptoms are severe, rest at home for the first 2 to 3 days. When you resume activity, don't let yourself get too tired.  · Avoid being exposed to cigarette smoke (yours or others).  · You may use acetaminophen or ibuprofen to control pain and fever, unless another medicine was prescribed. (Note: If you have chronic liver or kidney disease, have ever had a stomach ulcer or gastrointestinal bleeding, or are taking blood-thinning medicines, talk with your healthcare provider before using these medicines.) Aspirin should never be given to anyone under 18 years of age who is ill with a viral infection or fever. It may cause severe liver or brain damage.  · Your appetite may be poor, so a light diet is fine. Avoid dehydration by drinking 6 to 8 glasses of fluids per day (water, soft drinks, juices, tea, or soup). Extra fluids will help loosen secretions in the nose and lungs.  · Over-the-counter cold medicines will not shorten the length of time youre sick, but they may be helpful for the following symptoms: cough, sore throat, and nasal and sinus congestion. (Note: Do not use decongestants if you have high blood pressure.)  Follow-up  care  Follow up with your healthcare provider, or as advised.  When to seek medical advice  Call your healthcare provider right away if any of these occur:  · Cough with lots of colored sputum (mucus)  · Severe headache; face, neck, or ear pain  · Difficulty swallowing due to throat pain  · Fever of 100.4°F (38°C)  Call 911, or get immediate medical care  Call emergency services right away if any of these occur:  · Chest pain, shortness of breath, wheezing, or difficulty breathing  · Coughing up blood  · Inability to swallow due to throat pain  Date Last Reviewed: 9/13/2015  © 8346-5806 [employer]. [address] [place] [address], [place] [address]. All rights reserved. This information is not intended as a substitute for professional medical care. Always follow your healthcare professional's instructions.

## 2018-11-15 NOTE — PATIENT INSTRUCTIONS
COUGH SYRUP WILL MAKE YOU DROWSY- ONLY TAKE IT AT NIGHT    Viral Upper Respiratory Illness (Adult)  You have a viral upper respiratory illness (URI), which is another term for the common cold. This illness is contagious during the first few days. It is spread through the air by coughing and sneezing. It may also be spread by direct contact (touching the sick person and then touching your own eyes, nose, or mouth). Frequent handwashing will decrease risk of spread. Most viral illnesses go away within 7 to 10 days with rest and simple home remedies. Sometimes the illness may last for several weeks. Antibiotics will not kill a virus, and they are generally not prescribed for this condition.    Home care  · If symptoms are severe, rest at home for the first 2 to 3 days. When you resume activity, don't let yourself get too tired.  · Avoid being exposed to cigarette smoke (yours or others).  · You may use acetaminophen or ibuprofen to control pain and fever, unless another medicine was prescribed. (Note: If you have chronic liver or kidney disease, have ever had a stomach ulcer or gastrointestinal bleeding, or are taking blood-thinning medicines, talk with your healthcare provider before using these medicines.) Aspirin should never be given to anyone under 18 years of age who is ill with a viral infection or fever. It may cause severe liver or brain damage.  · Your appetite may be poor, so a light diet is fine. Avoid dehydration by drinking 6 to 8 glasses of fluids per day (water, soft drinks, juices, tea, or soup). Extra fluids will help loosen secretions in the nose and lungs.  · Over-the-counter cold medicines will not shorten the length of time youre sick, but they may be helpful for the following symptoms: cough, sore throat, and nasal and sinus congestion. (Note: Do not use decongestants if you have high blood pressure.)  Follow-up care  Follow up with your healthcare provider, or as advised.  When to seek  medical advice  Call your healthcare provider right away if any of these occur:  · Cough with lots of colored sputum (mucus)  · Severe headache; face, neck, or ear pain  · Difficulty swallowing due to throat pain  · Fever of 100.4°F (38°C)  Call 911, or get immediate medical care  Call emergency services right away if any of these occur:  · Chest pain, shortness of breath, wheezing, or difficulty breathing  · Coughing up blood  · Inability to swallow due to throat pain  Date Last Reviewed: 9/13/2015 © 2000-2017 [employer]. [address] [place] [id number]. All rights reserved. This information is not intended as a substitute for professional medical care. Always follow your healthcare professional's instructions.

## 2019-05-08 ENCOUNTER — HOSPITAL ENCOUNTER (EMERGENCY)
Facility: HOSPITAL | Age: 39
Discharge: HOME | End: 2019-05-09
Attending: EMERGENCY MEDICINE
Payer: MEDICAID

## 2019-05-08 DIAGNOSIS — S86.911A KNEE STRAIN, RIGHT, INITIAL ENCOUNTER: ICD-10-CM

## 2019-05-08 DIAGNOSIS — M25.561 ACUTE PAIN OF RIGHT KNEE: ICD-10-CM

## 2019-05-08 DIAGNOSIS — M25.461 KNEE EFFUSION, RIGHT: Primary | ICD-10-CM

## 2019-05-08 PROCEDURE — 25000003 PHARM REV CODE 250: Performed by: PHYSICIAN ASSISTANT

## 2019-05-08 PROCEDURE — 99283 EMERGENCY DEPT VISIT LOW MDM: CPT

## 2019-05-08 RX ORDER — OXYCODONE AND ACETAMINOPHEN 5; 325 MG/1; MG/1
1 TABLET ORAL EVERY 4 HOURS PRN
Qty: 12 TABLET | Refills: 0 | OUTPATIENT
Start: 2019-05-08 | End: 2019-07-24

## 2019-05-08 RX ORDER — OXYCODONE AND ACETAMINOPHEN 5; 325 MG/1; MG/1
1 TABLET ORAL
Status: COMPLETED | OUTPATIENT
Start: 2019-05-08 | End: 2019-05-08

## 2019-05-08 RX ADMIN — OXYCODONE HYDROCHLORIDE AND ACETAMINOPHEN 1 TABLET: 5; 325 TABLET ORAL at 11:05

## 2019-05-09 VITALS
DIASTOLIC BLOOD PRESSURE: 98 MMHG | OXYGEN SATURATION: 100 % | RESPIRATION RATE: 16 BRPM | BODY MASS INDEX: 32.33 KG/M2 | TEMPERATURE: 99 F | WEIGHT: 206 LBS | HEIGHT: 67 IN | HEART RATE: 84 BPM | SYSTOLIC BLOOD PRESSURE: 159 MMHG

## 2019-05-09 NOTE — ED PROVIDER NOTES
Encounter Date: 5/8/2019    SCRIBE #1 NOTE: I, Annika Garcia, am scribing for, and in the presence of,  Jeramie Schafer. I have scribed the following portions of the note - Other sections scribed: HPI, NAN.       History     Chief Complaint   Patient presents with    Knee Pain     pt states began having rt knee pain today about 1400 hrs today. pain increased as the day continued and tonight he noticed it was swollen      Nabil Alfonso is a 38 y.o. male who presents to the ED complaining of right knee pain beginning at 1400 today. The patient reports that the pain has worsened throughout the day and has associated swelling. He reports hearing a clicking sound while walking earlier today. He denies recent injury or trauma to the knee. He states that the posterior knee is sore, while there is pain and a tight and pulling sensation of the anterior knee. When extending the knee, he feels as though it will pop. He also reports several other musculoskeletal complaints including lower back pain, left hip pain, and chronic shoulder pain. The patient denies having allergies. He also adds that he recently moved into a larger house with a staircase, which may be a contributing factor to his knee pain.     The history is provided by the patient.     Review of patient's allergies indicates:  No Known Allergies  Past Medical History:   Diagnosis Date    Hypertension      Past Surgical History:   Procedure Laterality Date    ACHILLES TENDON SURGERY      HERNIA REPAIR       Family History   Problem Relation Age of Onset    Hypertension Mother     Hypertension Father      Social History     Tobacco Use    Smoking status: Former Smoker     Packs/day: 0.50     Types: Cigarettes    Smokeless tobacco: Never Used   Substance Use Topics    Alcohol use: Yes     Comment: occasionally    Drug use: No     Review of Systems   Constitutional: Negative for chills, diaphoresis, fatigue and fever.   HENT: Negative for rhinorrhea and sore  throat.    Eyes: Negative for discharge.   Respiratory: Negative for shortness of breath.    Cardiovascular: Negative for chest pain.   Gastrointestinal: Negative for abdominal pain and nausea.   Genitourinary: Negative for dysuria.   Musculoskeletal: Positive for back pain.   Skin: Negative for rash.   Neurological: Negative for weakness.   Hematological: Does not bruise/bleed easily.   All other systems reviewed and are negative.      Physical Exam     Initial Vitals [05/08/19 2246]   BP Pulse Resp Temp SpO2   (!) 177/105 86 16 98.6 °F (37 °C) 98 %      MAP       --         Physical Exam    Nursing note and vitals reviewed.  Constitutional: He appears well-developed and well-nourished. He is not diaphoretic. No distress.   Well-appearing nontoxic.  Resting comfortably on exam table.  Ambulating about the ED with mildly antalgic gait, favoring left leg.   HENT:   Head: Normocephalic and atraumatic.   Eyes: Conjunctivae and EOM are normal. Pupils are equal, round, and reactive to light.   Neck: Normal range of motion. Neck supple.   Cardiovascular: Intact distal pulses.   Pulmonary/Chest: No respiratory distress.   Abdominal: Soft. Bowel sounds are normal. He exhibits no distension. There is no tenderness.   Musculoskeletal:   There is effusion to right knee.  There is no erythema or warmth.  There is no open wound.  There is no bony deformity.  There is no crepitus with range of motion. He is unable to flex the right knee past approximately 90° secondary to discomfort.  There is exquisite tenderness to the distal aspect of the quadriceps tendon, just proximal to the patella. No pain with varus or valgus stress.  No joint laxity in comparison contralateral.  No popliteal tenderness or mass. No joint line tenderness. 2+ DP bilaterally. No micro motion pain.   Neurological: He is alert and oriented to person, place, and time. He has normal strength.   Skin: Skin is warm and dry. Capillary refill takes less than 2  seconds. No rash and no abscess noted. No erythema.   Psychiatric: He has a normal mood and affect. His behavior is normal. Judgment and thought content normal.         ED Course   Procedures  Labs Reviewed - No data to display       Imaging Results    None          Medical Decision Making:   Differential Diagnosis:   Fracture, contusion, sprain/strain, arthritis, overuse injury  ED Management:  No trauma. No bony deformity.  Ambulating with mildly antalgic gait.  Tolerating weight-bearing.  No bony tenderness. I do not feel need for imaging.  There is tenderness to distal quadriceps tendon region, just proximal to the patella.  Patella appears to have normal lie compared to contralateral.  He has smooth range of motion, however limited flexion secondary to discomfort.  He retains good distal pulses. There is no joint erythema or warmth.  There is some joint effusion.  I do not suspect septic joint at this time.  This may be a small ligamentous tear versus arthritis versus strain/sprain.  RICE precautions.  Pain control.  PCP follow-up.  He does understand and agree.  Return precautions given.    He is hypertensive.  There is no pretibial edema. There is no shortness of breath. His lungs are clear.  No chest pain. No history of DVT or PE.  Low risk group via [place].  I do not suspect VTE as culprit of swelling.    Pt has been carrying furniture up and down stairs x 2 days.  History of bilateral Achilles repair.                      Clinical Impression:       ICD-10-CM ICD-9-CM   1. Knee effusion, right M25.461 719.06   2. Knee strain, right, initial encounter S86.911A 844.9   3. Acute pain of right knee M25.561 719.46         Disposition:   Disposition: Discharged  Condition: Stable                        Jeramie Duvall PA-C  05/09/19 0034

## 2019-05-09 NOTE — DISCHARGE INSTRUCTIONS
RICE precautions.  Get some good rest.  Begin gentle range-of-motion exercises of your right knee, gentle stretching.  Follow-up and establish care with a primary care physician for re-evaluation.  If symptoms persist despite treatment, schedule appointment with Orthopedics.    Return to this ED if knee becomes red and warm, if unable to tolerate weight-bearing, if unable to bend or extend leg without significant pain, if you begin with fever, if any other problems occur.

## 2019-05-09 NOTE — ED TRIAGE NOTES
Patient arrived to ED with c/o right posterior knee pain and swelling.  Mild difficulty with ambulation.  Denies injury to area.  No acute distress noted.

## 2019-05-17 ENCOUNTER — HOSPITAL ENCOUNTER (EMERGENCY)
Facility: HOSPITAL | Age: 39
Discharge: HOME | End: 2019-05-17
Attending: EMERGENCY MEDICINE
Payer: MEDICAID

## 2019-05-17 VITALS
RESPIRATION RATE: 18 BRPM | HEART RATE: 67 BPM | WEIGHT: 204 LBS | OXYGEN SATURATION: 99 % | TEMPERATURE: 99 F | DIASTOLIC BLOOD PRESSURE: 98 MMHG | SYSTOLIC BLOOD PRESSURE: 151 MMHG | BODY MASS INDEX: 32.02 KG/M2 | HEIGHT: 67 IN

## 2019-05-17 DIAGNOSIS — M79.661 PAIN AND SWELLING OF RIGHT LOWER LEG: ICD-10-CM

## 2019-05-17 DIAGNOSIS — M71.21 BAKER'S CYST OF KNEE, RIGHT: Primary | ICD-10-CM

## 2019-05-17 DIAGNOSIS — M79.89 PAIN AND SWELLING OF RIGHT LOWER LEG: ICD-10-CM

## 2019-05-17 DIAGNOSIS — M25.561 RIGHT KNEE PAIN: ICD-10-CM

## 2019-05-17 PROCEDURE — 25000003 PHARM REV CODE 250: Performed by: PHYSICIAN ASSISTANT

## 2019-05-17 PROCEDURE — 99284 EMERGENCY DEPT VISIT MOD MDM: CPT

## 2019-05-17 RX ORDER — IBUPROFEN 600 MG/1
600 TABLET ORAL EVERY 6 HOURS PRN
Qty: 20 TABLET | Refills: 0 | Status: SHIPPED | OUTPATIENT
Start: 2019-05-17 | End: 2019-07-24

## 2019-05-17 RX ORDER — IBUPROFEN 600 MG/1
600 TABLET ORAL
Status: COMPLETED | OUTPATIENT
Start: 2019-05-17 | End: 2019-05-17

## 2019-05-17 RX ADMIN — IBUPROFEN 600 MG: 600 TABLET ORAL at 04:05

## 2019-05-17 NOTE — DISCHARGE INSTRUCTIONS
Rest, apply heating pad to the knee, compress with ace wrap and elevate the right knee as much as possible.     You have been prescribed Motrin for pain. Do not take additional NSAIDs (lodine, naproxen, clinoril, etc) while taking this medication.    Please follow-up with orthopedics as discussed for further evaluation and management of your pain.    If your right lower leg/calf pain continues, you should return to the ER for repeat lower extremity ultrasound.    Return to the emergency department for any new or worsening symptoms.

## 2019-05-17 NOTE — ED PROVIDER NOTES
Encounter Date: 5/17/2019    SCRIBE #1 NOTE: IDoris am scribing for, and in the presence of,  Sejal Padron PA-C. I have scribed the following portions of the note - Other sections scribed: HPI and ROS.       History     Chief Complaint   Patient presents with    Leg Swelling     Pt reports injuring right knee on monday last week, and seen here last wednesday and given pain medicaiton. states this morning noticed some swelling to the right knee down to the ankle     CC: Leg Swelling    HPI: This 38 y.o male who has HTN presents to the ED for an evaluation of acute onset, constant, 8/10 right lower extremity swelling that he noticed this morning.  Patient also reports of right posterior knee pain and calf pain.  Patient reports injuring his right knee approximately 1 week ago while helping his son.  Patient reports he was evaluated for his knee pain, but denies receiving any imaging.  He reports being prescribed hydrocodone, which he attempted treatment with today, but reports of no relief.  Patient denies fever, chills, extremity numbness, extremity weakness, rash, back pain, ankle pain, or any other associated symptoms.  No alleviating factors.  Patient reports having right achilles surgery in October 2017.      The history is provided by the patient. No  was used.     Review of patient's allergies indicates:  No Known Allergies  Past Medical History:   Diagnosis Date    Hypertension      Past Surgical History:   Procedure Laterality Date    ACHILLES TENDON SURGERY      BONE MARROW BIOPSY      HERNIA REPAIR       Family History   Problem Relation Age of Onset    Hypertension Mother     Hypertension Father      Social History     Tobacco Use    Smoking status: Former Smoker     Packs/day: 0.50     Types: Cigarettes    Smokeless tobacco: Never Used   Substance Use Topics    Alcohol use: Yes     Comment: occasionally    Drug use: No     Review of Systems   Constitutional:  Negative for chills and fever.   HENT: Negative for congestion, ear pain and sore throat.    Eyes: Negative for pain.   Respiratory: Negative for cough and shortness of breath.    Cardiovascular: Positive for leg swelling. Negative for chest pain.   Gastrointestinal: Negative for abdominal pain, diarrhea, nausea and vomiting.   Genitourinary: Negative for decreased urine volume and dysuria.   Musculoskeletal: Negative for back pain and neck pain.        (+) right calf pain  (+) right posterior knee pain   Skin: Negative for rash.   Neurological: Negative for weakness, numbness and headaches.   Psychiatric/Behavioral: Negative for confusion.       Physical Exam     Initial Vitals [05/17/19 1514]   BP Pulse Resp Temp SpO2   (!) 148/96 73 18 98.6 °F (37 °C) 100 %      MAP       --         Physical Exam    Nursing note and vitals reviewed.  Constitutional: Vital signs are normal. He appears well-developed and well-nourished. He is not diaphoretic. He is cooperative.  Non-toxic appearance. He does not have a sickly appearance. He does not appear ill. No distress.   HENT:   Head: Normocephalic and atraumatic.   Right Ear: External ear normal.   Left Ear: External ear normal.   Nose: Nose normal.   Mouth/Throat: Oropharynx is clear and moist.   Eyes: Conjunctivae, EOM and lids are normal. Pupils are equal, round, and reactive to light.   Neck: Trachea normal, normal range of motion, full passive range of motion without pain and phonation normal. Neck supple.   Cardiovascular: Normal rate, regular rhythm, normal heart sounds and intact distal pulses. Exam reveals no gallop and no friction rub.    No murmur heard.  Pulses:       Dorsalis pedis pulses are 2+ on the right side, and 2+ on the left side.   Capillary refill less than 2 sec in bilateral lower extremities.   Pulmonary/Chest: Effort normal and breath sounds normal. No respiratory distress. He has no decreased breath sounds. He has no wheezes. He has no rhonchi. He  has no rales.   Abdominal: Soft. Normal appearance and bowel sounds are normal. He exhibits no distension. There is no tenderness. There is no rigidity, no rebound, no guarding and no CVA tenderness.   Musculoskeletal: Normal range of motion.        Right knee: He exhibits normal range of motion, no swelling, no effusion, no ecchymosis, no deformity, no laceration, no erythema, normal alignment, no LCL laxity, normal patellar mobility, no bony tenderness, normal meniscus and no MCL laxity. Tenderness found.        Right ankle: Achilles tendon normal.        Left ankle: Achilles tendon normal.        Right lower leg: He exhibits tenderness (mild calf tenderness upon palpation) and swelling (mild). He exhibits no bony tenderness, no edema, no deformity and no laceration.        Legs:  No obvious deformity of the lower extremities. Patient ambulates without assistance.  There is mild swelling of the right lower leg and mild tenderness of the posterior right knee and right calf.  Peripheral pulses intact.  Peripheral strength and sensation intact.   Neurological: He is alert and oriented to person, place, and time. He has normal strength. No cranial nerve deficit or sensory deficit. He exhibits normal muscle tone. Coordination and gait normal. GCS eye subscore is 4. GCS verbal subscore is 5. GCS motor subscore is 6.   Skin: Skin is warm and dry. Capillary refill takes less than 2 seconds. No abrasion, no bruising, no burn, no ecchymosis, no laceration, no lesion, no rash and no abscess noted. No erythema.   Psychiatric: He has a normal mood and affect. His speech is normal and behavior is normal. Judgment and thought content normal. Cognition and memory are normal.         ED Course   Procedures  Labs Reviewed - No data to display       Imaging Results          X-Ray Knee 3 View Right (Final result)  Result time 05/17/19 17:04:50    Final result by Ray Navarro MD (05/17/19 17:04:50)                 Impression:       No acute abnormality or significant arthritis      Electronically signed by: Ray Navarro MD  Date:    05/17/2019  Time:    17:04             Narrative:    EXAMINATION:  XR KNEE 3 VIEW RIGHT    CLINICAL HISTORY:  Pain in right knee    TECHNIQUE:  AP, lateral, and Merchant views of the right knee were performed.    COMPARISON:  None    FINDINGS:  Skeletal structures at the knee are intact.  No fracture or dislocation is identified.  Joint spaces are satisfactory without significant cartilage loss.  No erosion, joint effusion, or focal soft tissue swelling is seen.                               US Lower Extremity Veins Right (Final result)  Result time 05/17/19 16:27:02    Final result by Ivan Rucker MD (05/17/19 16:27:02)                 Impression:      No evidence of deep venous thrombosis in the right lower extremity.    3.9 x 1.4 x 4.3 cm Baker's cyst in the right popliteal fossa.      Electronically signed by: Ivan Rucker  Date:    05/17/2019  Time:    16:27             Narrative:    EXAMINATION:  US LOWER EXTREMITY VEINS RIGHT    CLINICAL HISTORY:  Pain in right lower leg    TECHNIQUE:  Duplex and color flow Doppler evaluation and graded compression of the right lower extremity veins was performed.    COMPARISON:  None    FINDINGS:  Right thigh veins: The common femoral, femoral, popliteal, upper greater saphenous, and deep femoral veins are patent and free of thrombus. The veins are normally compressible and have normal phasic flow and augmentation response.    Right calf veins: The visualized calf veins are patent.    Contralateral CFV: The contralateral (left) common femoral vein is patent and free of thrombus.    Miscellaneous: 3.9 x 1.4 x 4.3 cm fluid collection is noted in the right popliteal fossa, likely representing a Baker's cyst.                                       APC / Resident Notes:   38-year-old male presents for consideration of right knee pain and right lower leg swelling.  Patient reports that he was seen recently for similar symptoms, which was initially elicited when he fell and twisted his right knee.  He states that he attempted to follow up with Orthopedics, but was told that the next appointment would be in September.  He reports that he was prescribed Norco for pain, with mild relief.  He denies new injuries or trauma. He denies immobility, recent surgery or hypercoagulability.    Patient is non-toxic, afebrile and well appearing. Exam findings: No obvious deformity of the lower extremities. No ligament laxity. No crepitus or sign of septic joint. Patient ambulates without assistance.  There is mild swelling of the right lower leg and mild tenderness of the posterior right knee and right calf.  Peripheral pulses intact.  Peripheral strength and sensation intact.    If available, past records have been reviewed.  Vitals are reassuring.  Results:  Ultrasound of the right lower extremity is revealing for a Baker cyst in the right popliteal fossa which explains his right posterior knee pain.  There is no evidence of DVT.  X-ray right knee is unrevealing for acute abnormality.  There is no large effusion, fracture or dislocation.  There is no bony erosion or destructive osseous process.    My overall impression:  Right knee pain, right lower leg swelling, Baker cyst of the right knee  DDx:  Knee pain, strain, ligament injury, Baker cyst, effusion, DVT, lower leg swelling, other    ED course:  Motrin and Ace wrap given in the emergency department for symptomatic care.  I have discussed rice therapy and Motrin for pain control.  I will encourage patient to follow up closely with Orthopedics for further evaluation and management of his symptoms. I will also recommend follow-up in 1 week for repeat right lower extremity ultrasound if the swelling persists or if calf pain worsens.  Patient verbalized understanding.  I feel this patient is stable for discharge. ED return precautions  given.    The diagnosis and treatment plan have been discussed with the patient. All questions and concerns have been addressed. Patient expressed understanding. An educational information sheet was given to the patient prior to discharge.     Sejal Mcnally PA-C         Scribe Attestation:   Scribe #1: I performed the above scribed service and the documentation accurately describes the services I performed. I attest to the accuracy of the note.    Attending Attestation:           Physician Attestation for Scribe:  Physician Attestation Statement for Scribe #1: I, Sejal Mcnally PA-C, reviewed documentation, as scribed by Doris Sheth in my presence, and it is both accurate and complete.                    Clinical Impression:       ICD-10-CM ICD-9-CM   1. Baker's cyst of knee, right M71.21 727.51   2. Pain and swelling of right lower leg M79.661 729.5    M79.89 729.81   3. Right knee pain M25.561 719.46         Disposition:   Disposition: Discharged  Condition: Stable                        Sejal Mcnally PA-C  05/17/19 2016

## 2019-05-17 NOTE — ED TRIAGE NOTES
Pt c/o RIGHT leg pain x11 days with swelling that began this AM. Pt initially injured the leg last Monday and seen in this ED last wednesday. Pain is 8/10. Pt took half a percocet around 1115 this AM

## 2019-07-24 ENCOUNTER — HOSPITAL ENCOUNTER (EMERGENCY)
Facility: HOSPITAL | Age: 39
Discharge: HOME | End: 2019-07-24
Attending: EMERGENCY MEDICINE
Payer: MEDICAID

## 2019-07-24 VITALS
OXYGEN SATURATION: 99 % | HEIGHT: 66 IN | DIASTOLIC BLOOD PRESSURE: 105 MMHG | WEIGHT: 205 LBS | SYSTOLIC BLOOD PRESSURE: 186 MMHG | HEART RATE: 61 BPM | RESPIRATION RATE: 21 BRPM | TEMPERATURE: 99 F | BODY MASS INDEX: 32.95 KG/M2

## 2019-07-24 DIAGNOSIS — I10 HYPERTENSION, UNSPECIFIED TYPE: ICD-10-CM

## 2019-07-24 DIAGNOSIS — K08.89 PAIN, DENTAL: Primary | ICD-10-CM

## 2019-07-24 PROCEDURE — 63600175 PHARM REV CODE 636 W HCPCS: Performed by: EMERGENCY MEDICINE

## 2019-07-24 PROCEDURE — 25000003 PHARM REV CODE 250: Performed by: EMERGENCY MEDICINE

## 2019-07-24 PROCEDURE — 96372 THER/PROPH/DIAG INJ SC/IM: CPT

## 2019-07-24 PROCEDURE — 99284 EMERGENCY DEPT VISIT MOD MDM: CPT | Mod: 25

## 2019-07-24 RX ORDER — CLINDAMYCIN HYDROCHLORIDE 150 MG/1
300 CAPSULE ORAL
Status: DISCONTINUED | OUTPATIENT
Start: 2019-07-24 | End: 2019-07-24

## 2019-07-24 RX ORDER — CLINDAMYCIN HYDROCHLORIDE 150 MG/1
300 CAPSULE ORAL EVERY 8 HOURS
Qty: 60 CAPSULE | Refills: 0 | Status: SHIPPED | OUTPATIENT
Start: 2019-07-24 | End: 2019-08-03

## 2019-07-24 RX ORDER — LISINOPRIL 20 MG/1
20 TABLET ORAL
Status: COMPLETED | OUTPATIENT
Start: 2019-07-24 | End: 2019-07-24

## 2019-07-24 RX ORDER — AMOXICILLIN 250 MG/1
500 CAPSULE ORAL
Status: DISCONTINUED | OUTPATIENT
Start: 2019-07-24 | End: 2019-07-24

## 2019-07-24 RX ORDER — CLINDAMYCIN HYDROCHLORIDE 150 MG/1
300 CAPSULE ORAL
Status: COMPLETED | OUTPATIENT
Start: 2019-07-24 | End: 2019-07-24

## 2019-07-24 RX ORDER — KETOROLAC TROMETHAMINE 30 MG/ML
60 INJECTION, SOLUTION INTRAMUSCULAR; INTRAVENOUS
Status: COMPLETED | OUTPATIENT
Start: 2019-07-24 | End: 2019-07-24

## 2019-07-24 RX ORDER — OXYCODONE AND ACETAMINOPHEN 5; 325 MG/1; MG/1
1 TABLET ORAL EVERY 4 HOURS PRN
Qty: 12 TABLET | Refills: 0 | Status: SHIPPED | OUTPATIENT
Start: 2019-07-24 | End: 2023-01-31

## 2019-07-24 RX ADMIN — CLINDAMYCIN HYDROCHLORIDE 300 MG: 150 CAPSULE ORAL at 02:07

## 2019-07-24 RX ADMIN — LISINOPRIL 20 MG: 20 TABLET ORAL at 02:07

## 2019-07-24 RX ADMIN — KETOROLAC TROMETHAMINE 60 MG: 30 INJECTION, SOLUTION INTRAMUSCULAR; INTRAVENOUS at 02:07

## 2019-07-24 NOTE — ED PROVIDER NOTES
"Encounter Date: 7/24/2019       History     Chief Complaint   Patient presents with    Abscess     Pt seen at urgent care today for L jaw abscess with sinus tenderness. Pt also states he did not take his BP meds today.      38 y.o. male Past Medical History:  No date: Hypertension     Sent in from urgent care for evaluation of htn/dental pain . Pt notes that he has had issues with L maxillary molar for some time, notes that it has become more painful, went to urgent care who redirected him here for "abscess" of jaw. Pt notes that he has gotten his bp meds refilled but has not been able to pick them up. No headache/vomiting/confusion.        Review of patient's allergies indicates:  No Known Allergies  Past Medical History:   Diagnosis Date    Hypertension      Past Surgical History:   Procedure Laterality Date    ACHILLES TENDON SURGERY      BONE MARROW BIOPSY      HERNIA REPAIR       Family History   Problem Relation Age of Onset    Hypertension Mother     Hypertension Father      Social History     Tobacco Use    Smoking status: Former Smoker     Packs/day: 0.50     Types: Cigarettes    Smokeless tobacco: Never Used   Substance Use Topics    Alcohol use: Yes     Comment: occasionally    Drug use: No     Review of Systems   Constitutional: Negative for fever.   HENT: Negative for sore throat.    Respiratory: Negative for shortness of breath.    Cardiovascular: Negative for chest pain.   Gastrointestinal: Negative for nausea.   Genitourinary: Negative for dysuria.   Musculoskeletal: Negative for back pain.   Skin: Negative for rash.   Neurological: Negative for weakness.   Hematological: Does not bruise/bleed easily.   All other systems reviewed and are negative.      Physical Exam     Initial Vitals [07/24/19 1341]   BP Pulse Resp Temp SpO2   (!) 200/119 (!) 59 18 98.4 °F (36.9 °C) 98 %      MAP       --         Physical Exam    Nursing note and vitals reviewed.  Constitutional: He appears well-developed " and well-nourished.   HENT:   Head: Normocephalic and atraumatic.   Eyes: EOM are normal. Pupils are equal, round, and reactive to light.   Cardiovascular: Normal rate and regular rhythm.   Pulmonary/Chest: Effort normal.   Abdominal: He exhibits no distension.   Musculoskeletal: He exhibits no edema or tenderness.   Neurological: He is alert and oriented to person, place, and time. No cranial nerve deficit.   Skin: Skin is warm and dry.   Psychiatric: He has a normal mood and affect.     ttp L maxillary molar likely cavitary, no gumline/facial swelling, no abscess/induration  B/l tm's clear      ED Course   Procedures  Labs Reviewed - No data to display       Imaging Results    None          Medical Decision Making:   Initial Assessment:   Htn: have restarted lisinopril, asymptomatic.  Dental pain: pain meds, abx, f/u dentist.                      Clinical Impression:       ICD-10-CM ICD-9-CM   1. Pain, dental K08.89 525.9   2. Hypertension, unspecified type I10 401.9                                Myra Araujo MD  07/24/19 1930

## 2019-07-24 NOTE — DISCHARGE INSTRUCTIONS
Thank you for coming to our Emergency Department today. It is important to remember that some problems are difficult to diagnose and may not be found during your first visit. Be sure to follow up with your primary care doctor and review any labs/imaging that was performed with them. If you do not have a primary care doctor, you may contact the one listed on your discharge paperwork or you may also call the [facility] Appointment Desk at [phone] to schedule an appointment with one.     All medications may potentially have side effects and it is impossible to predict which medications may give you side effects. If you feel that you are having a negative effect of any medication you should immediately stop taking them and seek medical attention.    Return to the ER with any questions/concerns, new/concerning symptoms, worsening or failure to improve. Do not drive or make any important decisions for 24 hours if you have received any pain medications, sedatives or mood altering drugs during your ER visit.

## 2019-07-24 NOTE — ED TRIAGE NOTES
Pt arrived to the ED due to left sided cheek oral pain and swelling that started a couple days ago. Pt denies any other s/s. Pt reports that he did not take his BP medication today and that is why is BP is elevated

## 2020-01-16 ENCOUNTER — HOSPITAL ENCOUNTER (EMERGENCY)
Facility: HOSPITAL | Age: 40
Discharge: HOME | End: 2020-01-16
Attending: EMERGENCY MEDICINE
Payer: MEDICAID

## 2020-01-16 VITALS
DIASTOLIC BLOOD PRESSURE: 82 MMHG | BODY MASS INDEX: 32.33 KG/M2 | HEART RATE: 62 BPM | RESPIRATION RATE: 18 BRPM | HEIGHT: 67 IN | TEMPERATURE: 99 F | WEIGHT: 206 LBS | SYSTOLIC BLOOD PRESSURE: 135 MMHG | OXYGEN SATURATION: 98 %

## 2020-01-16 DIAGNOSIS — R11.2 NAUSEA, VOMITING, AND DIARRHEA: ICD-10-CM

## 2020-01-16 DIAGNOSIS — D72.829 LEUKOCYTOSIS, UNSPECIFIED TYPE: ICD-10-CM

## 2020-01-16 DIAGNOSIS — R19.7 NAUSEA, VOMITING, AND DIARRHEA: ICD-10-CM

## 2020-01-16 DIAGNOSIS — R10.30 LOWER ABDOMINAL PAIN: Primary | ICD-10-CM

## 2020-01-16 LAB
ALBUMIN SERPL BCP-MCNC: 4.5 G/DL (ref 3.5–5.2)
ALP SERPL-CCNC: 89 U/L (ref 55–135)
ALT SERPL W/O P-5'-P-CCNC: 22 U/L (ref 10–44)
ANION GAP SERPL CALC-SCNC: 11 MMOL/L (ref 8–16)
AST SERPL-CCNC: 23 U/L (ref 10–40)
BACTERIA #/AREA URNS HPF: ABNORMAL /HPF
BASOPHILS # BLD AUTO: 0.11 K/UL (ref 0–0.2)
BASOPHILS NFR BLD: 0.5 % (ref 0–1.9)
BILIRUB SERPL-MCNC: 0.5 MG/DL (ref 0.1–1)
BILIRUB UR QL STRIP: NEGATIVE
BUN SERPL-MCNC: 18 MG/DL (ref 6–20)
CALCIUM SERPL-MCNC: 10.3 MG/DL (ref 8.7–10.5)
CHLORIDE SERPL-SCNC: 102 MMOL/L (ref 95–110)
CLARITY UR: CLEAR
CO2 SERPL-SCNC: 25 MMOL/L (ref 23–29)
COLOR UR: YELLOW
CREAT SERPL-MCNC: 1.3 MG/DL (ref 0.5–1.4)
DIFFERENTIAL METHOD: ABNORMAL
EOSINOPHIL # BLD AUTO: 0.2 K/UL (ref 0–0.5)
EOSINOPHIL NFR BLD: 0.8 % (ref 0–8)
ERYTHROCYTE [DISTWIDTH] IN BLOOD BY AUTOMATED COUNT: 14.6 % (ref 11.5–14.5)
EST. GFR  (AFRICAN AMERICAN): >60 ML/MIN/1.73 M^2
EST. GFR  (NON AFRICAN AMERICAN): >60 ML/MIN/1.73 M^2
GLUCOSE SERPL-MCNC: 111 MG/DL (ref 70–110)
GLUCOSE UR QL STRIP: NEGATIVE
HCT VFR BLD AUTO: 48.2 % (ref 40–54)
HGB BLD-MCNC: 15.3 G/DL (ref 14–18)
HGB UR QL STRIP: NEGATIVE
IMM GRANULOCYTES # BLD AUTO: 0.15 K/UL (ref 0–0.04)
IMM GRANULOCYTES NFR BLD AUTO: 0.7 % (ref 0–0.5)
KETONES UR QL STRIP: NEGATIVE
LEUKOCYTE ESTERASE UR QL STRIP: ABNORMAL
LIPASE SERPL-CCNC: 25 U/L (ref 4–60)
LYMPHOCYTES # BLD AUTO: 3.6 K/UL (ref 1–4.8)
LYMPHOCYTES NFR BLD: 18 % (ref 18–48)
MCH RBC QN AUTO: 28.7 PG (ref 27–31)
MCHC RBC AUTO-ENTMCNC: 31.7 G/DL (ref 32–36)
MCV RBC AUTO: 90 FL (ref 82–98)
MICROSCOPIC COMMENT: ABNORMAL
MONOCYTES # BLD AUTO: 1.1 K/UL (ref 0.3–1)
MONOCYTES NFR BLD: 5.5 % (ref 4–15)
NEUTROPHILS # BLD AUTO: 14.9 K/UL (ref 1.8–7.7)
NEUTROPHILS NFR BLD: 74.5 % (ref 38–73)
NITRITE UR QL STRIP: NEGATIVE
NRBC BLD-RTO: 0 /100 WBC
PH UR STRIP: 5 [PH] (ref 5–8)
PLATELET # BLD AUTO: 347 K/UL (ref 150–350)
PMV BLD AUTO: 9.2 FL (ref 9.2–12.9)
POTASSIUM SERPL-SCNC: 4.4 MMOL/L (ref 3.5–5.1)
PROT SERPL-MCNC: 8.7 G/DL (ref 6–8.4)
PROT UR QL STRIP: NEGATIVE
RBC # BLD AUTO: 5.34 M/UL (ref 4.6–6.2)
RBC #/AREA URNS HPF: 4 /HPF (ref 0–4)
SODIUM SERPL-SCNC: 138 MMOL/L (ref 136–145)
SP GR UR STRIP: 1.02 (ref 1–1.03)
URN SPEC COLLECT METH UR: ABNORMAL
UROBILINOGEN UR STRIP-ACNC: NEGATIVE EU/DL
WBC # BLD AUTO: 20.02 K/UL (ref 3.9–12.7)
WBC #/AREA URNS HPF: 12 /HPF (ref 0–5)

## 2020-01-16 PROCEDURE — 96361 HYDRATE IV INFUSION ADD-ON: CPT

## 2020-01-16 PROCEDURE — 81000 URINALYSIS NONAUTO W/SCOPE: CPT

## 2020-01-16 PROCEDURE — 85025 COMPLETE CBC W/AUTO DIFF WBC: CPT

## 2020-01-16 PROCEDURE — 25500020 PHARM REV CODE 255: Performed by: NURSE PRACTITIONER

## 2020-01-16 PROCEDURE — 87491 CHLMYD TRACH DNA AMP PROBE: CPT

## 2020-01-16 PROCEDURE — 87086 URINE CULTURE/COLONY COUNT: CPT

## 2020-01-16 PROCEDURE — 99285 EMERGENCY DEPT VISIT HI MDM: CPT | Mod: 25

## 2020-01-16 PROCEDURE — 63600175 PHARM REV CODE 636 W HCPCS: Performed by: NURSE PRACTITIONER

## 2020-01-16 PROCEDURE — 96374 THER/PROPH/DIAG INJ IV PUSH: CPT

## 2020-01-16 PROCEDURE — 80053 COMPREHEN METABOLIC PANEL: CPT

## 2020-01-16 PROCEDURE — 83690 ASSAY OF LIPASE: CPT

## 2020-01-16 RX ORDER — ONDANSETRON 2 MG/ML
8 INJECTION INTRAMUSCULAR; INTRAVENOUS
Status: COMPLETED | OUTPATIENT
Start: 2020-01-16 | End: 2020-01-16

## 2020-01-16 RX ORDER — ONDANSETRON 4 MG/1
4 TABLET, ORALLY DISINTEGRATING ORAL EVERY 6 HOURS PRN
Qty: 20 TABLET | Refills: 0 | Status: SHIPPED | OUTPATIENT
Start: 2020-01-16 | End: 2023-01-31

## 2020-01-16 RX ORDER — TIZANIDINE 2 MG/1
2 TABLET ORAL EVERY 8 HOURS PRN
Qty: 15 TABLET | Refills: 0 | Status: SHIPPED | OUTPATIENT
Start: 2020-01-16 | End: 2020-11-03 | Stop reason: HOSPADM

## 2020-01-16 RX ADMIN — ONDANSETRON HYDROCHLORIDE 8 MG: 2 SOLUTION INTRAMUSCULAR; INTRAVENOUS at 12:01

## 2020-01-16 RX ADMIN — IOHEXOL 100 ML: 350 INJECTION, SOLUTION INTRAVENOUS at 12:01

## 2020-01-16 RX ADMIN — SODIUM CHLORIDE 1000 ML: 0.9 INJECTION, SOLUTION INTRAVENOUS at 12:01

## 2020-01-16 NOTE — ED PROVIDER NOTES
Encounter Date: 1/16/2020    SCRIBE #1 NOTE: I, Nina Blount, am scribing for, and in the presence of,  Neal Rodriguez NP. I have scribed the following portions of the note - Other sections scribed: HPI, ROS, PE.       History     Chief Complaint   Patient presents with    Abdominal Pain     Pt c/o nausea, generalized abd pain, generalized body aches starting yesterday. Denies fever.      This is a 39 y.o. male with a PMHx of Hypertension who presents to the ED complaining of lower abdominal pain and N/V/D that started 2 days ago. He reports having associated chills and diaphoresis. He notes having diaphoresis with bowel movements. He denies taking any medication for his symptoms. He denies any recent sick contact. He reports a past surgical history of hernia repair and denies appendectomy and cholecystectomy. He denies fever. No other associated symptoms. No alleviating factors.    The history is provided by the patient. No  was used.     Review of patient's allergies indicates:  No Known Allergies  Past Medical History:   Diagnosis Date    Hypertension      Past Surgical History:   Procedure Laterality Date    ACHILLES TENDON SURGERY      BONE MARROW BIOPSY      HERNIA REPAIR       Family History   Problem Relation Age of Onset    Hypertension Mother     Hypertension Father      Social History     Tobacco Use    Smoking status: Former Smoker     Packs/day: 0.50     Types: Cigarettes    Smokeless tobacco: Never Used   Substance Use Topics    Alcohol use: Yes     Comment: occasionally    Drug use: Yes     Types: Marijuana     Review of Systems   Constitutional: Positive for chills and diaphoresis. Negative for fever.   HENT: Negative for sore throat.    Respiratory: Negative for shortness of breath.    Cardiovascular: Negative for chest pain.   Gastrointestinal: Positive for abdominal pain, diarrhea, nausea and vomiting.   Genitourinary: Negative for dysuria.   Musculoskeletal: Negative  for back pain.   Skin: Negative for rash.   Neurological: Negative for weakness.   Hematological: Does not bruise/bleed easily.       Physical Exam     Initial Vitals [01/16/20 1026]   BP Pulse Resp Temp SpO2   (!) 141/89 84 18 98.2 °F (36.8 °C) 99 %      MAP       --         Physical Exam    Nursing note and vitals reviewed.  Constitutional: He appears well-developed and well-nourished. No distress.   HENT:   Head: Normocephalic and atraumatic.   Right Ear: Tympanic membrane normal.   Left Ear: Tympanic membrane normal.   Nose: Nose normal.   Mouth/Throat: Uvula is midline, oropharynx is clear and moist and mucous membranes are normal.   Eyes: EOM are normal. Pupils are equal, round, and reactive to light.   Neck: Normal range of motion. Neck supple.   Cardiovascular: Normal rate, regular rhythm and normal heart sounds. Exam reveals no gallop and no friction rub.    No murmur heard.  Pulmonary/Chest: Effort normal and breath sounds normal. No respiratory distress. He has no wheezes. He has no rhonchi. He has no rales.   Abdominal: Soft. Bowel sounds are normal. He exhibits no mass. There is tenderness. There is guarding. There is no rebound.   Bilateral lower abdominal tenderness to palpation with voluntary guarding.   Musculoskeletal: Normal range of motion.   Neurological: He is alert and oriented to person, place, and time. He has normal strength. No cranial nerve deficit or sensory deficit.   Skin: Skin is warm and dry. Capillary refill takes less than 2 seconds.   Psychiatric: He has a normal mood and affect.         ED Course   Procedures  Labs Reviewed   CBC W/ AUTO DIFFERENTIAL - Abnormal; Notable for the following components:       Result Value    WBC 20.02 (*)     Mean Corpuscular Hemoglobin Conc 31.7 (*)     RDW 14.6 (*)     Immature Granulocytes 0.7 (*)     Gran # (ANC) 14.9 (*)     Immature Grans (Abs) 0.15 (*)     Mono # 1.1 (*)     Gran% 74.5 (*)     All other components within normal limits    COMPREHENSIVE METABOLIC PANEL - Abnormal; Notable for the following components:    Glucose 111 (*)     Total Protein 8.7 (*)     All other components within normal limits   URINALYSIS, REFLEX TO URINE CULTURE - Abnormal; Notable for the following components:    Leukocytes, UA Trace (*)     All other components within normal limits    Narrative:     Preferred Collection Type->Urine, Clean Catch   URINALYSIS MICROSCOPIC - Abnormal; Notable for the following components:    WBC, UA 12 (*)     All other components within normal limits    Narrative:     Preferred Collection Type->Urine, Clean Catch   CULTURE, URINE   C. TRACHOMATIS/N. GONORRHOEAE BY AMP DNA   LIPASE          Imaging Results          CT Abdomen Pelvis With Contrast (Final result)  Result time 01/16/20 13:23:54    Final result by Osei William MD (01/16/20 13:23:54)                 Impression:      No acute process or CT findings identified to explain patient's symptoms of left lower quadrant pain.    Mild diverticulosis coli without diverticulitis.      Electronically signed by: Osei William MD  Date:    01/16/2020  Time:    13:23             Narrative:    EXAMINATION:  CT ABDOMEN PELVIS WITH CONTRAST    CLINICAL HISTORY:  LLQ pain, suspect diverticulitis;    TECHNIQUE:  Low dose axial images, sagittal and coronal reformations were obtained from the lung bases to the pubic symphysis following the IV administration of 100 mL of Omnipaque 350 .  Oral contrast was not given.    COMPARISON:  CT abdomen and pelvis 08/03/2016    FINDINGS:  Imaged lung bases are clear.  Base of the heart is within normal limits.    Liver, gallbladder, pancreas, spleen, stomach, duodenum and bilateral adrenal glands are within normal limits.  No biliary ductal dilatation.    Bilateral kidneys are normal in size, shape and location with symmetric normal enhancement.  No hydronephrosis or significant perinephric stranding.  Ureters are nondilated.  Urinary bladder is within  normal limits.  Prostate and seminal vesicles are within normal limits.    No ascites, free air or lymphadenopathy.  No aortic aneurysm or dissection.  No significant aortic atherosclerosis.    Small fat containing umbilical hernia and small fat containing right inguinal hernia.  Appendix and terminal ileum are within normal limits.  Several scattered colonic diverticula without focal diverticulitis.  No evidence of bowel obstruction or inflammation.  No pneumatosis or portal venous gas.    Osseous structures appear stable without acute process seen.                                 Medical Decision Making:   History:   Old Medical Records: I decided to obtain old medical records.  Differential Diagnosis:   Diverticulitis, colitis, appendicitis, SBO, UTI, enteritis, others  Clinical Tests:   Lab Tests: Ordered and Reviewed  Radiological Study: Ordered and Reviewed  ED Management:  HPI and physical exam as above.    Patient with bilateral lower abdominal tenderness to palpation and voluntary guarding. Patient is otherwise afebrile, pleasant, active, very well-appearing, and in no distress.  The remaining abdomen is soft and nontender. Urinalysis with 12 WBCs, however patient denies urinary frequency, dysuria, penile discharge, or any other symptoms of a UTI.  Urine cultures in process.  GC testing is also in process.  CMP and lipase are within normal limits.  CBC remarkable for leukocytosis of 20k.  CT of the abdomen pelvis is without evidence of any acute abnormalities.  No evidence of acute infection to explain the patient's leukocytosis.  When findings were discussed with the patient he states that he has consistently had elevated white blood cell counts in his blood work for years.  He states that he has seen Hematology/Oncology and been worked up for this finding extensively without any diagnosis or cause.  Despite past workups I advised patient to follow up with his PCP for further evaluation of his  leukocytosis.  He is very well-appearing and in no apparent distress prior to discharge. He is tolerating p.o. without difficulty.  Advised patient to follow up with his PCP for re-evaluation and further management.  Strict ED return precautions given. All questions regarding diagnosis and plan were answered to the patient's fullest possible satisfaction. Patient expressed understanding of diagnosis, discharge instructions, and return precautions.            Patient note was created using [employer] voice dictation software.  Any errors in syntax or information may not have been identified and edited prior to signing this note.              Scribe Attestation:   Scribe #1: I performed the above scribed service and the documentation accurately describes the services I performed. I attest to the accuracy of the note.                          Clinical Impression:       ICD-10-CM ICD-9-CM   1. Lower abdominal pain R10.30 789.09   2. Leukocytosis, unspecified type D72.829 288.60   3. Nausea, vomiting, and diarrhea R11.2 787.91    R19.7 787.01         Disposition:   Disposition: Discharged  Condition: Stable    I, Neal Rodriguez NP, personally performed the services described in this documentation. All medical record entries made by the scribe were at my direction and in my presence.  I have reviewed the chart and agree that the record reflects my personal performance and is accurate and complete.                 Neal Rodriguez NP  01/17/20 0912

## 2020-01-16 NOTE — ED TRIAGE NOTES
Pt. Reports abd pain and 3 episodes of loose stools on today. Pt. Reports he has gen body aches, chills, cough and nausea. Pt. Denies any fevers.

## 2020-01-16 NOTE — DISCHARGE INSTRUCTIONS
Take medications as prescribed.  Do not drive or drink alcohol when taking muscle relaxers.    Follow-up with your regular doctor in 3-4 days for elevated white blood cell count.    Return to the emergency department immediately for any new or worsening symptoms.    Thank you for coming to our Emergency Department today. It is important to remember that some problems are difficult to diagnose and may not be found during your first visit. Be sure to follow up with your primary care doctor.  If you do not have one, you may contact the one listed on your discharge paperwork or you may also call the [facility] Appointment Desk at [phone] to schedule an appointment with one.     Return to the ER with any questions/concerns, new/concerning symptoms, worsening or failure to improve. Do not drive or make any important decisions for 24 hours if you have received any pain medications, sedatives or mood altering drugs during your ER visit.

## 2020-01-17 LAB
C TRACH DNA SPEC QL NAA+PROBE: NOT DETECTED
N GONORRHOEA DNA SPEC QL NAA+PROBE: NOT DETECTED

## 2020-01-18 LAB — BACTERIA UR CULT: NORMAL

## 2020-07-21 ENCOUNTER — HOSPITAL ENCOUNTER (EMERGENCY)
Facility: HOSPITAL | Age: 40
Discharge: HOME | End: 2020-07-21
Attending: EMERGENCY MEDICINE
Payer: MEDICAID

## 2020-07-21 VITALS
HEIGHT: 67 IN | HEART RATE: 82 BPM | WEIGHT: 215 LBS | TEMPERATURE: 98 F | BODY MASS INDEX: 33.74 KG/M2 | RESPIRATION RATE: 16 BRPM | DIASTOLIC BLOOD PRESSURE: 97 MMHG | SYSTOLIC BLOOD PRESSURE: 190 MMHG | OXYGEN SATURATION: 99 %

## 2020-07-21 DIAGNOSIS — R07.9 CHEST PAIN: ICD-10-CM

## 2020-07-21 DIAGNOSIS — I10 HYPERTENSION, UNSPECIFIED TYPE: Primary | ICD-10-CM

## 2020-07-21 DIAGNOSIS — K21.9 GASTROESOPHAGEAL REFLUX DISEASE, ESOPHAGITIS PRESENCE NOT SPECIFIED: ICD-10-CM

## 2020-07-21 LAB
ALBUMIN SERPL BCP-MCNC: 4.1 G/DL (ref 3.5–5.2)
ALP SERPL-CCNC: 71 U/L (ref 55–135)
ALT SERPL W/O P-5'-P-CCNC: 13 U/L (ref 10–44)
ANION GAP SERPL CALC-SCNC: 13 MMOL/L (ref 8–16)
AST SERPL-CCNC: 15 U/L (ref 10–40)
BASOPHILS # BLD AUTO: 0.1 K/UL (ref 0–0.2)
BASOPHILS NFR BLD: 0.5 % (ref 0–1.9)
BILIRUB SERPL-MCNC: 0.5 MG/DL (ref 0.1–1)
BUN SERPL-MCNC: 9 MG/DL (ref 6–20)
CALCIUM SERPL-MCNC: 9.2 MG/DL (ref 8.7–10.5)
CHLORIDE SERPL-SCNC: 103 MMOL/L (ref 95–110)
CO2 SERPL-SCNC: 26 MMOL/L (ref 23–29)
CREAT SERPL-MCNC: 1.1 MG/DL (ref 0.5–1.4)
DIFFERENTIAL METHOD: ABNORMAL
EOSINOPHIL # BLD AUTO: 0.2 K/UL (ref 0–0.5)
EOSINOPHIL NFR BLD: 0.9 % (ref 0–8)
ERYTHROCYTE [DISTWIDTH] IN BLOOD BY AUTOMATED COUNT: 14.6 % (ref 11.5–14.5)
EST. GFR  (AFRICAN AMERICAN): >60 ML/MIN/1.73 M^2
EST. GFR  (NON AFRICAN AMERICAN): >60 ML/MIN/1.73 M^2
GLUCOSE SERPL-MCNC: 90 MG/DL (ref 70–110)
HCT VFR BLD AUTO: 43.6 % (ref 40–54)
HGB BLD-MCNC: 14 G/DL (ref 14–18)
IMM GRANULOCYTES # BLD AUTO: 0.11 K/UL (ref 0–0.04)
IMM GRANULOCYTES NFR BLD AUTO: 0.5 % (ref 0–0.5)
LYMPHOCYTES # BLD AUTO: 6.6 K/UL (ref 1–4.8)
LYMPHOCYTES NFR BLD: 30.3 % (ref 18–48)
MAGNESIUM SERPL-MCNC: 2 MG/DL (ref 1.6–2.6)
MCH RBC QN AUTO: 28.9 PG (ref 27–31)
MCHC RBC AUTO-ENTMCNC: 32.1 G/DL (ref 32–36)
MCV RBC AUTO: 90 FL (ref 82–98)
MONOCYTES # BLD AUTO: 1.1 K/UL (ref 0.3–1)
MONOCYTES NFR BLD: 5 % (ref 4–15)
NEUTROPHILS # BLD AUTO: 13.8 K/UL (ref 1.8–7.7)
NEUTROPHILS NFR BLD: 62.8 % (ref 38–73)
NRBC BLD-RTO: 0 /100 WBC
PLATELET # BLD AUTO: 341 K/UL (ref 150–350)
PMV BLD AUTO: 9.5 FL (ref 9.2–12.9)
POTASSIUM SERPL-SCNC: 3.7 MMOL/L (ref 3.5–5.1)
PROT SERPL-MCNC: 7.3 G/DL (ref 6–8.4)
RBC # BLD AUTO: 4.85 M/UL (ref 4.6–6.2)
SODIUM SERPL-SCNC: 142 MMOL/L (ref 136–145)
STOMATOCYTES BLD QL SMEAR: PRESENT
TROPONIN I SERPL DL<=0.01 NG/ML-MCNC: 0.01 NG/ML (ref 0–0.03)
WBC # BLD AUTO: 21.89 K/UL (ref 3.9–12.7)

## 2020-07-21 PROCEDURE — 80053 COMPREHEN METABOLIC PANEL: CPT

## 2020-07-21 PROCEDURE — 83735 ASSAY OF MAGNESIUM: CPT

## 2020-07-21 PROCEDURE — 84484 ASSAY OF TROPONIN QUANT: CPT

## 2020-07-21 PROCEDURE — 93010 ELECTROCARDIOGRAM REPORT: CPT | Mod: ,,, | Performed by: INTERNAL MEDICINE

## 2020-07-21 PROCEDURE — 93005 ELECTROCARDIOGRAM TRACING: CPT

## 2020-07-21 PROCEDURE — 93010 EKG 12-LEAD: ICD-10-PCS | Mod: ,,, | Performed by: INTERNAL MEDICINE

## 2020-07-21 PROCEDURE — 25000003 PHARM REV CODE 250: Performed by: EMERGENCY MEDICINE

## 2020-07-21 PROCEDURE — 85025 COMPLETE CBC W/AUTO DIFF WBC: CPT

## 2020-07-21 PROCEDURE — 99285 EMERGENCY DEPT VISIT HI MDM: CPT | Mod: 25

## 2020-07-21 RX ORDER — NITROGLYCERIN 0.4 MG/1
0.4 TABLET SUBLINGUAL EVERY 5 MIN PRN
Status: DISCONTINUED | OUTPATIENT
Start: 2020-07-21 | End: 2020-07-21 | Stop reason: HOSPADM

## 2020-07-21 RX ORDER — ASPIRIN 325 MG
325 TABLET ORAL
Status: COMPLETED | OUTPATIENT
Start: 2020-07-21 | End: 2020-07-21

## 2020-07-21 RX ADMIN — ASPIRIN 325 MG ORAL TABLET 325 MG: 325 PILL ORAL at 06:07

## 2020-07-21 NOTE — ED TRIAGE NOTES
"Pt presents to ED with CC of anterior intermittent 7/10 chest pain x 2 hours. Pt reports it has since resolved "I didn't want to take any chances." Pt denies N/V/D, SOB.     AAOx4, able to verbalize and follow commands, ambulate independently.   "

## 2020-07-21 NOTE — PROVIDER PROGRESS NOTES - EMERGENCY DEPT.
Emergency Department TeleTRIAGE Encounter Note      CHIEF COMPLAINT    Chief Complaint   Patient presents with    Chest Pain     left sided radiates to the right side... feels like an achying pain.. started 2 hrs pta (laying down).. no pmhx of heart problems       VITAL SIGNS   Initial Vitals [07/21/20 1811]   BP Pulse Resp Temp SpO2   (S) (!) 178/108 77 16 98.7 °F (37.1 °C) 98 %      MAP       --            ALLERGIES    Review of patient's allergies indicates:  No Known Allergies    PROVIDER TRIAGE NOTE  This is a teletriage evaluation of a 39 y.o. male presenting to the ED with c/o chest pain radiating from the left to the right side chest x2 hours.  History of hypertension.  Denies shortness of breath, cough, or fever. Initial orders will be placed and care will be transferred to an alternate provider when patient is roomed for a full evaluation. Any additional orders and the final disposition will be determined by that provider.         ORDERS  Labs Reviewed - No data to display    ED Orders (720h ago, onward)    Start Ordered     Status Ordering Provider    07/21/20 1818 07/21/20 1817  X-Ray Chest AP Portable  1 time imaging      Ordered PRIMITIVO ARREOLA    07/21/20 1813 07/21/20 1812  EKG 12-lead  Once      Ordered THERON WISEMAN            Virtual Visit Note: The provider triage portion of this emergency department evaluation and documentation was performed via vitalclip, a HIPAA-compliant telemedicine application, in concert with a tele-presenter in the room. A face to face patient evaluation with one of my colleagues will occur once the patient is placed in an emergency department room.      DISCLAIMER: This note was prepared with [employer] voice recognition transcription software. Garbled syntax, mangled pronouns, and other bizarre constructions may be attributed to that software system.

## 2020-07-21 NOTE — ED PROVIDER NOTES
Encounter Date: 7/21/2020    SCRIBE #1 NOTE: I, Jolly Dane, am scribing for, and in the presence of,  Delvin Steven MD. I have scribed the entire note.       History     Chief Complaint   Patient presents with    Chest Pain     left sided radiates to the right side... feels like an achying pain.. started 2 hrs pta (laying down).. no pmhx of heart problems     This is a 38 y/o male with a PMHx of Hypertension. He presents to the ED with a CC of chest pain that began 2 hours PTA. Patient was laying down when he felt aching anterior chest pain that lasted about 45 minutes. The patient reports having a headache prior to having chest pain. He did not take any medications for the pain. Patient has been taking Hypertension medications properly. He denies any palpitations, shortness of breath, n/v/d, dysuria, diaphoreses, back pain, joint pain, or cold-like symptoms. Patient's blood pressure is normally around 140/80. Patient is currently trying to quit smoking.  Pain has resolved.  Patient is to belching episode.  Belching seems improve pain.  No history of exertional chest pain.    The history is provided by the patient.     Review of patient's allergies indicates:  No Known Allergies  Past Medical History:   Diagnosis Date    Hypertension      Past Surgical History:   Procedure Laterality Date    ACHILLES TENDON SURGERY      BONE MARROW BIOPSY      HERNIA REPAIR       Family History   Problem Relation Age of Onset    Hypertension Mother     Hypertension Father      Social History     Tobacco Use    Smoking status: Former Smoker     Packs/day: 0.50     Types: Cigarettes    Smokeless tobacco: Never Used   Substance Use Topics    Alcohol use: Yes     Comment: occasionally    Drug use: Not Currently     Types: Marijuana     Review of Systems   Constitutional: Negative for chills, diaphoresis, fatigue and fever.   HENT: Negative for congestion, sinus pain and sore throat.    Respiratory: Negative for  cough, shortness of breath, wheezing and stridor.    Cardiovascular: Positive for chest pain. Negative for palpitations and leg swelling.   Gastrointestinal: Negative for abdominal pain, diarrhea, nausea and vomiting.   Genitourinary: Negative for dysuria, flank pain and frequency.   Musculoskeletal: Negative for back pain and joint swelling.   Neurological: Positive for headaches. Negative for dizziness, syncope, facial asymmetry, speech difficulty, weakness and numbness.   Psychiatric/Behavioral: Negative for confusion.       Physical Exam     Initial Vitals [07/21/20 1811]   BP Pulse Resp Temp SpO2   (S) (!) 178/108 77 16 98.7 °F (37.1 °C) 98 %      MAP       --         Physical Exam    Nursing note and vitals reviewed.  Constitutional: He appears well-developed and well-nourished. He is not diaphoretic. No distress.   HENT:   Head: Normocephalic and atraumatic.   Nose: Nose normal.   Eyes: Conjunctivae and EOM are normal. Pupils are equal, round, and reactive to light. Right eye exhibits no discharge. Left eye exhibits no discharge. No scleral icterus.   Neck: Neck supple. No thyromegaly present. No tracheal deviation present. No JVD present.   Cardiovascular: Normal rate, regular rhythm and normal heart sounds.   No murmur heard.  Pulmonary/Chest: Breath sounds normal. No stridor. No respiratory distress. He has no wheezes. He has no rhonchi. He has no rales. He exhibits no tenderness.   Abdominal: Soft. Bowel sounds are normal. He exhibits no distension. There is no abdominal tenderness. There is no rebound and no guarding.   Musculoskeletal: Normal range of motion. No tenderness or edema.   Neurological: He is alert and oriented to person, place, and time.   Skin: Skin is warm and dry. No rash noted. No pallor.   Psychiatric: He has a normal mood and affect. His behavior is normal. Judgment and thought content normal.         ED Course   Procedures  Labs Reviewed   CBC W/ AUTO DIFFERENTIAL   COMPREHENSIVE  METABOLIC PANEL   TROPONIN I   MAGNESIUM     EKG Readings: (Independently Interpreted)   Initial Reading: No STEMI. Rhythm: Normal Sinus Rhythm. Heart Rate: 70 bpm. Ectopy: No Ectopy. Conduction: Normal. ST Segments: Non-Specific ST Segment Depression. T Waves: Normal. Axis: Normal.   minimal voltage criteria for LVH, may be normal varient       Imaging Results          X-Ray Chest AP Portable (In process)                  Medical Decision Making:   Initial Assessment:   This is a 38 y/o male with a PMHx of Hypertension. He presents to the ED with a CC of chest pain that began 2 hours PTA and since resolved. Labs, EKG, and Chest Xray will be ordered. Patient will be treated for symptoms and reassessed.     Differential Diagnosis:   ACS, GERD, hypertension  Independently Interpreted Test(s):   I have ordered and independently interpreted X-rays - see prior notes.  I have ordered and independently interpreted EKG Reading(s) - see prior notes  Clinical Tests:   Lab Tests: Ordered and Reviewed  Radiological Study: Ordered and Reviewed  Medical Tests: Ordered and Reviewed  ED Management:  1940:  Patient remains chest pain-free.  No ischemic changes on EKG.  Troponin is normal.  Heart score is 2.  Patient stable for outpatient follow-up.  Suspect GERD.  Patient struck to take Pepcid and Maalox as needed.  Patient instructed follow up with primary care concerning elevation is blood pressure night.  Patient will monitor blood pressure at home and continue current medications.  No evidence of end-organ damage from hypertension.    Additional MDM:   Heart Score:    History:          Slightly suspicious.  ECG:             Nonspecific repolarisation disturbance  Age:               Less than 45 years  Risk factors: 1-2 risk factors  Troponin:       Less than or equal to normal limit  Final Score: 2             Scribe Attestation:   Scribe #1: I performed the above scribed service and the documentation accurately describes the  services I performed. I attest to the accuracy of the note.                          Clinical Impression:       ICD-10-CM ICD-9-CM   1. Hypertension, unspecified type  I10 401.9   2. Chest pain  R07.9 786.50   3. Gastroesophageal reflux disease, esophagitis presence not specified  K21.9 530.81         Disposition:   Disposition: Discharged  Condition: Stable     I, Delvin Steven MD, personally performed the services described in this documentation. All medical record entries made by the scribe were at my direction and in my presence.  I have reviewed the chart and agree that the record reflects my personal performance and is accurate and complete.                      Delvin Steven MD  07/21/20 1942

## 2020-11-03 ENCOUNTER — HOSPITAL ENCOUNTER (EMERGENCY)
Facility: HOSPITAL | Age: 40
Discharge: HOME | End: 2020-11-03
Attending: EMERGENCY MEDICINE
Payer: MEDICAID

## 2020-11-03 VITALS
TEMPERATURE: 98 F | WEIGHT: 205 LBS | RESPIRATION RATE: 20 BRPM | HEART RATE: 62 BPM | BODY MASS INDEX: 32.18 KG/M2 | OXYGEN SATURATION: 100 % | DIASTOLIC BLOOD PRESSURE: 111 MMHG | SYSTOLIC BLOOD PRESSURE: 169 MMHG | HEIGHT: 67 IN

## 2020-11-03 DIAGNOSIS — R03.0 ELEVATED BLOOD PRESSURE READING: ICD-10-CM

## 2020-11-03 DIAGNOSIS — S46.211A STRAIN OF RIGHT BICEPS, INITIAL ENCOUNTER: Primary | ICD-10-CM

## 2020-11-03 DIAGNOSIS — D72.829 LEUKOCYTOSIS, UNSPECIFIED TYPE: ICD-10-CM

## 2020-11-03 LAB
ALBUMIN SERPL BCP-MCNC: 4 G/DL (ref 3.5–5.2)
ALP SERPL-CCNC: 69 U/L (ref 55–135)
ALT SERPL W/O P-5'-P-CCNC: 17 U/L (ref 10–44)
ANION GAP SERPL CALC-SCNC: 9 MMOL/L (ref 8–16)
AST SERPL-CCNC: 14 U/L (ref 10–40)
BASOPHILS # BLD AUTO: 0.08 K/UL (ref 0–0.2)
BASOPHILS NFR BLD: 0.4 % (ref 0–1.9)
BILIRUB SERPL-MCNC: 0.5 MG/DL (ref 0.1–1)
BILIRUB UR QL STRIP: NEGATIVE
BUN SERPL-MCNC: 11 MG/DL (ref 6–20)
CALCIUM SERPL-MCNC: 8.8 MG/DL (ref 8.7–10.5)
CHLORIDE SERPL-SCNC: 104 MMOL/L (ref 95–110)
CK SERPL-CCNC: 182 U/L (ref 20–200)
CLARITY UR: CLEAR
CO2 SERPL-SCNC: 27 MMOL/L (ref 23–29)
COLOR UR: YELLOW
CREAT SERPL-MCNC: 1.1 MG/DL (ref 0.5–1.4)
DIFFERENTIAL METHOD: ABNORMAL
EOSINOPHIL # BLD AUTO: 0.2 K/UL (ref 0–0.5)
EOSINOPHIL NFR BLD: 1 % (ref 0–8)
ERYTHROCYTE [DISTWIDTH] IN BLOOD BY AUTOMATED COUNT: 14.6 % (ref 11.5–14.5)
EST. GFR  (AFRICAN AMERICAN): >60 ML/MIN/1.73 M^2
EST. GFR  (NON AFRICAN AMERICAN): >60 ML/MIN/1.73 M^2
GLUCOSE SERPL-MCNC: 104 MG/DL (ref 70–110)
GLUCOSE UR QL STRIP: NEGATIVE
HCT VFR BLD AUTO: 44.6 % (ref 40–54)
HGB BLD-MCNC: 14.5 G/DL (ref 14–18)
HGB UR QL STRIP: ABNORMAL
IMM GRANULOCYTES # BLD AUTO: 0.11 K/UL (ref 0–0.04)
IMM GRANULOCYTES NFR BLD AUTO: 0.5 % (ref 0–0.5)
KETONES UR QL STRIP: NEGATIVE
LEUKOCYTE ESTERASE UR QL STRIP: ABNORMAL
LYMPHOCYTES # BLD AUTO: 5.1 K/UL (ref 1–4.8)
LYMPHOCYTES NFR BLD: 25.5 % (ref 18–48)
MCH RBC QN AUTO: 29.1 PG (ref 27–31)
MCHC RBC AUTO-ENTMCNC: 32.5 G/DL (ref 32–36)
MCV RBC AUTO: 89 FL (ref 82–98)
MICROSCOPIC COMMENT: ABNORMAL
MONOCYTES # BLD AUTO: 1.4 K/UL (ref 0.3–1)
MONOCYTES NFR BLD: 7.1 % (ref 4–15)
NEUTROPHILS # BLD AUTO: 13.1 K/UL (ref 1.8–7.7)
NEUTROPHILS NFR BLD: 65.5 % (ref 38–73)
NITRITE UR QL STRIP: NEGATIVE
NRBC BLD-RTO: 0 /100 WBC
PH UR STRIP: 6 [PH] (ref 5–8)
PLATELET # BLD AUTO: 320 K/UL (ref 150–350)
PMV BLD AUTO: 10.2 FL (ref 9.2–12.9)
POTASSIUM SERPL-SCNC: 3.9 MMOL/L (ref 3.5–5.1)
PROT SERPL-MCNC: 7.3 G/DL (ref 6–8.4)
PROT UR QL STRIP: NEGATIVE
RBC # BLD AUTO: 4.99 M/UL (ref 4.6–6.2)
RBC #/AREA URNS HPF: 5 /HPF (ref 0–4)
SODIUM SERPL-SCNC: 140 MMOL/L (ref 136–145)
SP GR UR STRIP: 1.02 (ref 1–1.03)
URN SPEC COLLECT METH UR: ABNORMAL
UROBILINOGEN UR STRIP-ACNC: NEGATIVE EU/DL
WBC # BLD AUTO: 20.08 K/UL (ref 3.9–12.7)
WBC #/AREA URNS HPF: 10 /HPF (ref 0–5)

## 2020-11-03 PROCEDURE — 80053 COMPREHEN METABOLIC PANEL: CPT

## 2020-11-03 PROCEDURE — 63600175 PHARM REV CODE 636 W HCPCS: Performed by: PHYSICIAN ASSISTANT

## 2020-11-03 PROCEDURE — 81000 URINALYSIS NONAUTO W/SCOPE: CPT

## 2020-11-03 PROCEDURE — 99284 EMERGENCY DEPT VISIT MOD MDM: CPT | Mod: 25

## 2020-11-03 PROCEDURE — 85025 COMPLETE CBC W/AUTO DIFF WBC: CPT

## 2020-11-03 PROCEDURE — 82550 ASSAY OF CK (CPK): CPT

## 2020-11-03 PROCEDURE — 87086 URINE CULTURE/COLONY COUNT: CPT

## 2020-11-03 PROCEDURE — 96374 THER/PROPH/DIAG INJ IV PUSH: CPT

## 2020-11-03 PROCEDURE — 25000003 PHARM REV CODE 250: Performed by: PHYSICIAN ASSISTANT

## 2020-11-03 RX ORDER — IBUPROFEN 600 MG/1
600 TABLET ORAL EVERY 6 HOURS PRN
Qty: 20 TABLET | Refills: 0 | Status: SHIPPED | OUTPATIENT
Start: 2020-11-03 | End: 2020-11-08

## 2020-11-03 RX ORDER — KETOROLAC TROMETHAMINE 30 MG/ML
15 INJECTION, SOLUTION INTRAMUSCULAR; INTRAVENOUS
Status: COMPLETED | OUTPATIENT
Start: 2020-11-03 | End: 2020-11-03

## 2020-11-03 RX ORDER — CYCLOBENZAPRINE HCL 10 MG
10 TABLET ORAL 3 TIMES DAILY PRN
Qty: 20 TABLET | Refills: 0 | Status: SHIPPED | OUTPATIENT
Start: 2020-11-03 | End: 2020-11-10

## 2020-11-03 RX ORDER — LIDOCAINE 50 MG/G
1 PATCH TOPICAL DAILY
Qty: 15 PATCH | Refills: 0 | Status: SHIPPED | OUTPATIENT
Start: 2020-11-03 | End: 2020-11-18

## 2020-11-03 RX ORDER — ACETAMINOPHEN 500 MG
1000 TABLET ORAL
Status: COMPLETED | OUTPATIENT
Start: 2020-11-03 | End: 2020-11-03

## 2020-11-03 RX ORDER — LIDOCAINE 50 MG/G
1 PATCH TOPICAL
Status: DISCONTINUED | OUTPATIENT
Start: 2020-11-03 | End: 2020-11-03 | Stop reason: HOSPADM

## 2020-11-03 RX ORDER — LISINOPRIL 20 MG/1
20 TABLET ORAL
Status: COMPLETED | OUTPATIENT
Start: 2020-11-03 | End: 2020-11-03

## 2020-11-03 RX ORDER — CLONIDINE HYDROCHLORIDE 0.1 MG/1
0.1 TABLET ORAL
Status: DISCONTINUED | OUTPATIENT
Start: 2020-11-03 | End: 2020-11-03

## 2020-11-03 RX ADMIN — LIDOCAINE 1 PATCH: 50 PATCH TOPICAL at 11:11

## 2020-11-03 RX ADMIN — KETOROLAC TROMETHAMINE 15 MG: 30 INJECTION, SOLUTION INTRAMUSCULAR; INTRAVENOUS at 09:11

## 2020-11-03 RX ADMIN — LISINOPRIL 20 MG: 20 TABLET ORAL at 09:11

## 2020-11-03 RX ADMIN — SODIUM CHLORIDE 1000 ML: 0.9 INJECTION, SOLUTION INTRAVENOUS at 09:11

## 2020-11-03 RX ADMIN — ACETAMINOPHEN 1000 MG: 500 TABLET ORAL at 09:11

## 2020-11-03 NOTE — DISCHARGE INSTRUCTIONS
Take medications as prescribed. Follow up with primary care in 2 days. Return to ER for worsening symptoms or as needed

## 2020-11-03 NOTE — ED PROVIDER NOTES
Encounter Date: 11/3/2020    SCRIBE #1 NOTE: I, Nina Blount, am scribing for, and in the presence of,  MELODY Phan. I have scribed the following portions of the note - Other sections scribed: NAN DUMONT.       History     Chief Complaint   Patient presents with    Arm Pain     right arm pain radiating from right shoulder down to hands,hurys to lift     This is a 40 y.o. male with a PMHx of HTN who presents to the ED complaining of constant right upper arm pain that started yesterday. He reports that he woke up with the pain yesterday. He states that he helped lift a 300-400 lb generator out of a truck the day before the pain started. He denies any recent fall or trauma. He notes that the pain shoots up his arm to his shoulder and down his arm to his hand. He states that when he lifts his arm he feels a tingling sensation in his fingers. He denies taking his blood pressure medication this morning. He denies a PMHx of kidney problems or Rhabdomyolysis. He denies any drug use. Denies fever, chills, SOB, chest pain, N/V/D, neck pain, visual disturbance, headache, dizziness, and light-headedness. No other associated symptoms.     The history is provided by the patient. No  was used.     Review of patient's allergies indicates:  No Known Allergies  Past Medical History:   Diagnosis Date    Hypertension      Past Surgical History:   Procedure Laterality Date    ACHILLES TENDON SURGERY      BONE MARROW BIOPSY      HERNIA REPAIR       Family History   Problem Relation Age of Onset    Hypertension Mother     Hypertension Father      Social History     Tobacco Use    Smoking status: Former Smoker     Packs/day: 0.50     Types: Cigarettes    Smokeless tobacco: Never Used   Substance Use Topics    Alcohol use: Yes     Comment: occasionally    Drug use: Not Currently     Types: Marijuana     Review of Systems   Constitutional: Negative for chills and fever.   HENT: Negative for sore throat.     Eyes: Negative for visual disturbance.   Respiratory: Negative for shortness of breath.    Cardiovascular: Negative for chest pain.   Gastrointestinal: Negative for diarrhea, nausea and vomiting.   Genitourinary: Negative for dysuria.   Musculoskeletal: Positive for myalgias (right arm pain). Negative for back pain and neck pain.   Skin: Negative for rash.   Neurological: Negative for dizziness, weakness, light-headedness and headaches.        (+) Tingling to fingers   Hematological: Does not bruise/bleed easily.       Physical Exam     Initial Vitals [11/03/20 0838]   BP Pulse Resp Temp SpO2   (S) (!) 198/116 77 20 98.5 °F (36.9 °C) 98 %      MAP       --         Physical Exam    Nursing note and vitals reviewed.  Constitutional: He appears well-developed and well-nourished. No distress.   HENT:   Head: Normocephalic and atraumatic.   Right Ear: Tympanic membrane normal.   Left Ear: Tympanic membrane normal.   Nose: Nose normal.   Mouth/Throat: Uvula is midline, oropharynx is clear and moist and mucous membranes are normal.   Eyes: EOM are normal. Pupils are equal, round, and reactive to light.   Neck: Trachea normal, normal range of motion, full passive range of motion without pain and phonation normal. Neck supple. No stridor present. No spinous process tenderness and no muscular tenderness present. Normal range of motion present. No neck rigidity.   Cardiovascular: Intact distal pulses.   Pulses:       Radial pulses are 2+ on the right side and 2+ on the left side.   Pulmonary/Chest: Effort normal.   Musculoskeletal: Normal range of motion.      Comments: TTP over the R bicep with no swelling. Pt hesitant to move RUE due to pain. Full passive ROM. No overlying erythema or swelling. Normal  strength.    Neurological: He is alert and oriented to person, place, and time. He has normal strength. No sensory deficit.   Skin: Skin is warm and dry. Capillary refill takes less than 2 seconds.   Psychiatric: He has  a normal mood and affect.         ED Course   Procedures  Labs Reviewed   CBC W/ AUTO DIFFERENTIAL - Abnormal; Notable for the following components:       Result Value    WBC 20.08 (*)     RDW 14.6 (*)     Gran # (ANC) 13.1 (*)     Immature Grans (Abs) 0.11 (*)     Lymph # 5.1 (*)     Mono # 1.4 (*)     All other components within normal limits   URINALYSIS, REFLEX TO URINE CULTURE - Abnormal; Notable for the following components:    Occult Blood UA 1+ (*)     Leukocytes, UA Trace (*)     All other components within normal limits    Narrative:     Specimen Source->Urine   URINALYSIS MICROSCOPIC - Abnormal; Notable for the following components:    RBC, UA 5 (*)     WBC, UA 10 (*)     All other components within normal limits    Narrative:     Specimen Source->Urine   CULTURE, URINE   COMPREHENSIVE METABOLIC PANEL   CK          Imaging Results    None          Medical Decision Making:   Initial Assessment:   40-year-old male n with history of hypertension presenting for evaluation of pain to the right bicep with intermittent pain radiating up to the right side of the neck and into the right arm.  He reports associated intermittent tingling.  Symptoms began after lifting a generator yesterday.  He denies any fevers, chills, nausea vomiting, chest pain, shortness of breath, dizziness, lightheadedness.  Exam above. Considered but doubt fracture or dislocation the absence of trauma.  No evidence of infectious etiology of patient's symptoms.  Patient has leukocytosis that is chronic. He states he has follow-up with his primary care for this previously.  Due to significant myalgia CMP and CPK ordered and negative for rhabdomyolysis.  Patient does have history of hypertension.  He did not take his medication today.  He is asymptomatic at discharge with no chest pain, shortness of breath, dizziness lightheadedness.  Will treat patient with muscle relaxers and anti-inflammatories for muscle strain.  Will have him follow-up  with ortho for further evaluation management return ER for worsening symptoms or as needed.  Clinical Tests:   Lab Tests: Ordered and Reviewed            Scribe Attestation:   Scribe #1: I performed the above scribed service and the documentation accurately describes the services I performed. I attest to the accuracy of the note.                      Clinical Impression:     ICD-10-CM ICD-9-CM   1. Strain of right biceps, initial encounter  S46.211A 840.8   2. Elevated blood pressure reading  R03.0 796.2   3. Leukocytosis, unspecified type  D72.829 288.60                          ED Disposition Condition    Discharge Stable        ED Prescriptions     Medication Sig Dispense Start Date End Date Auth. Provider    ibuprofen (ADVIL,MOTRIN) 600 MG tablet Take 1 tablet (600 mg total) by mouth every 6 (six) hours as needed for Pain. 20 tablet 11/3/2020 11/8/2020 Leslie Johnson PA-C    cyclobenzaprine (FLEXERIL) 10 MG tablet Take 1 tablet (10 mg total) by mouth 3 (three) times daily as needed for Muscle spasms. 20 tablet 11/3/2020 11/10/2020 Leslie Johnson PA-C    lidocaine (LIDODERM) 5 % Place 1 patch onto the skin once daily. Remove & Discard patch within 12 hours or as directed by MD. May use 4% over the counter if not covered by insurance for 15 days 15 patch 11/3/2020 11/18/2020 Leslie Johnson PA-C        Follow-up Information     Follow up With Specialties Details Why Contact [facility]  Schedule an appointment as soon as possible for a visit   [address]  [address]  [facility] [address]  [phone]      [facility]  Schedule an appointment as soon as possible for a visit   [address]  [place] [address]  [phone]      [facility] Go to  As needed, If symptoms worsen [address] [facility] [address] [phone]                      Scribe Attestation: I,  Leslie Johnson PA-C personally performed the services described in this documentation. All medical record entries made by the scribe were at my direction and in my presence. I have reviewed the chart and agree that the record reflects my personal performance and is accurate and complete.                 Leslie Johnson PA-C  11/03/20 1524

## 2020-11-03 NOTE — ED TRIAGE NOTES
Pt presents to ED w/ c/o right arm pain that began yesterday morning. Pt states he moved a generator 1-2 days ago and believed this may be the cause of his pain. Report taking a muscle relaxer, but no relief felt. He reports tingling, but denies numbness in his fingers. NAD noted. Will continue to monitor.

## 2020-11-05 LAB — BACTERIA UR CULT: NORMAL

## 2021-09-30 ENCOUNTER — HOSPITAL ENCOUNTER (EMERGENCY)
Facility: HOSPITAL | Age: 41
Discharge: HOME | End: 2021-09-30
Attending: EMERGENCY MEDICINE
Payer: MEDICAID

## 2021-09-30 VITALS
OXYGEN SATURATION: 98 % | SYSTOLIC BLOOD PRESSURE: 178 MMHG | DIASTOLIC BLOOD PRESSURE: 100 MMHG | TEMPERATURE: 98 F | BODY MASS INDEX: 32.95 KG/M2 | WEIGHT: 205 LBS | RESPIRATION RATE: 18 BRPM | HEART RATE: 78 BPM | HEIGHT: 66 IN

## 2021-09-30 DIAGNOSIS — M20.011 MALLET FINGER OF RIGHT HAND: ICD-10-CM

## 2021-09-30 DIAGNOSIS — M79.605 LEFT LEG PAIN: ICD-10-CM

## 2021-09-30 DIAGNOSIS — M25.572 LEFT ANKLE PAIN: ICD-10-CM
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
## 2021-09-30 PROCEDURE — 96372 THER/PROPH/DIAG INJ SC/IM: CPT

## 2021-09-30 PROCEDURE — 99284 EMERGENCY DEPT VISIT MOD MDM: CPT | Mod: 25

## 2021-09-30 PROCEDURE — 63600175 PHARM REV CODE 636 W HCPCS: Performed by: NURSE PRACTITIONER

## 2021-09-30 RX ORDER — KETOROLAC TROMETHAMINE 30 MG/ML
15 INJECTION, SOLUTION INTRAMUSCULAR; INTRAVENOUS
Status: COMPLETED | OUTPATIENT
Start: 2021-09-30 | End: 2021-09-30

## 2021-09-30 RX ORDER — SULINDAC 150 MG/1
150 TABLET ORAL 2 TIMES DAILY
Qty: 10 TABLET | Refills: 0 | Status: SHIPPED | OUTPATIENT
Start: 2021-09-30 | End: 2023-01-31

## 2021-09-30 RX ADMIN — KETOROLAC TROMETHAMINE 15 MG: 30 INJECTION, SOLUTION INTRAMUSCULAR at 02:09

## 2021-12-09 LAB
ALBUMIN SERPL BCP-MCNC: 3.9 G/DL (ref 3.5–5.2)
ALP SERPL-CCNC: 71 U/L (ref 55–135)
ALT SERPL W/O P-5'-P-CCNC: 21 U/L (ref 10–44)
ANION GAP SERPL CALC-SCNC: 10 MMOL/L (ref 8–16)
AST SERPL-CCNC: 17 U/L (ref 10–40)
BASOPHILS # BLD AUTO: 0.07 K/UL (ref 0–0.2)
BASOPHILS NFR BLD: 0.4 % (ref 0–1.9)
BILIRUB SERPL-MCNC: 0.4 MG/DL (ref 0.1–1)
BNP SERPL-MCNC: 39 PG/ML (ref 0–99)
BUN SERPL-MCNC: 15 MG/DL (ref 6–20)
CALCIUM SERPL-MCNC: 9.1 MG/DL (ref 8.7–10.5)
CHLORIDE SERPL-SCNC: 102 MMOL/L (ref 95–110)
CO2 SERPL-SCNC: 27 MMOL/L (ref 23–29)
CREAT SERPL-MCNC: 1.3 MG/DL (ref 0.5–1.4)
D DIMER PPP IA.FEU-MCNC: 0.35 MG/L FEU
DIFFERENTIAL METHOD: ABNORMAL
EOSINOPHIL # BLD AUTO: 0.3 K/UL (ref 0–0.5)
EOSINOPHIL NFR BLD: 2 % (ref 0–8)
ERYTHROCYTE [DISTWIDTH] IN BLOOD BY AUTOMATED COUNT: 13.8 % (ref 11.5–14.5)
EST. GFR  (AFRICAN AMERICAN): >60 ML/MIN/1.73 M^2
EST. GFR  (NON AFRICAN AMERICAN): >60 ML/MIN/1.73 M^2
GLUCOSE SERPL-MCNC: 112 MG/DL (ref 70–110)
HCT VFR BLD AUTO: 42.9 % (ref 40–54)
HGB BLD-MCNC: 13.8 G/DL (ref 14–18)
IMM GRANULOCYTES # BLD AUTO: 0.08 K/UL (ref 0–0.04)
IMM GRANULOCYTES NFR BLD AUTO: 0.5 % (ref 0–0.5)
LYMPHOCYTES # BLD AUTO: 4.6 K/UL (ref 1–4.8)
LYMPHOCYTES NFR BLD: 27.6 % (ref 18–48)
MCH RBC QN AUTO: 29.7 PG (ref 27–31)
MCHC RBC AUTO-ENTMCNC: 32.2 G/DL (ref 32–36)
MCV RBC AUTO: 93 FL (ref 82–98)
MONOCYTES # BLD AUTO: 1 K/UL (ref 0.3–1)
MONOCYTES NFR BLD: 6.3 % (ref 4–15)
NEUTROPHILS # BLD AUTO: 10.4 K/UL (ref 1.8–7.7)
NEUTROPHILS NFR BLD: 63.2 % (ref 38–73)
NRBC BLD-RTO: 0 /100 WBC
PLATELET # BLD AUTO: 310 K/UL (ref 150–450)
PMV BLD AUTO: 9.5 FL (ref 9.2–12.9)
POTASSIUM SERPL-SCNC: 4.1 MMOL/L (ref 3.5–5.1)
PROT SERPL-MCNC: 7 G/DL (ref 6–8.4)
RBC # BLD AUTO: 4.64 M/UL (ref 4.6–6.2)
SODIUM SERPL-SCNC: 139 MMOL/L (ref 136–145)
TROPONIN I SERPL DL<=0.01 NG/ML-MCNC: <0.006 NG/ML (ref 0–0.03)
WBC # BLD AUTO: 16.46 K/UL (ref 3.9–12.7)

## 2021-12-09 PROCEDURE — 84484 ASSAY OF TROPONIN QUANT: CPT | Performed by: NURSE PRACTITIONER

## 2021-12-09 PROCEDURE — 83880 ASSAY OF NATRIURETIC PEPTIDE: CPT | Performed by: NURSE PRACTITIONER

## 2021-12-09 PROCEDURE — 99284 EMERGENCY DEPT VISIT MOD MDM: CPT | Mod: 25

## 2021-12-09 PROCEDURE — 93010 EKG 12-LEAD: ICD-10-PCS | Mod: ,,, | Performed by: INTERNAL MEDICINE

## 2021-12-09 PROCEDURE — 85025 COMPLETE CBC W/AUTO DIFF WBC: CPT | Performed by: NURSE PRACTITIONER

## 2021-12-09 PROCEDURE — 93005 ELECTROCARDIOGRAM TRACING: CPT

## 2021-12-09 PROCEDURE — 85379 FIBRIN DEGRADATION QUANT: CPT | Performed by: NURSE PRACTITIONER

## 2021-12-09 PROCEDURE — 80053 COMPREHEN METABOLIC PANEL: CPT | Performed by: NURSE PRACTITIONER

## 2021-12-09 PROCEDURE — 93010 ELECTROCARDIOGRAM REPORT: CPT | Mod: ,,, | Performed by: INTERNAL MEDICINE

## 2021-12-10 ENCOUNTER — HOSPITAL ENCOUNTER (EMERGENCY)
Facility: HOSPITAL | Age: 41
Discharge: HOME | End: 2021-12-10
Attending: EMERGENCY MEDICINE
Payer: MEDICAID

## 2021-12-10 VITALS
WEIGHT: 207 LBS | HEART RATE: 58 BPM | OXYGEN SATURATION: 97 % | BODY MASS INDEX: 33.41 KG/M2 | TEMPERATURE: 99 F | DIASTOLIC BLOOD PRESSURE: 92 MMHG | SYSTOLIC BLOOD PRESSURE: 148 MMHG | RESPIRATION RATE: 29 BRPM

## 2021-12-10 DIAGNOSIS — R07.9 CHEST PAIN: ICD-10-CM

## 2022-07-12 ENCOUNTER — IMMUNIZATION (OUTPATIENT)
Dept: OBSTETRICS AND GYNECOLOGY | Facility: CLINIC | Age: 42
End: 2022-07-12
Payer: MEDICAID

## 2022-07-12 DIAGNOSIS — Z23 NEED FOR VACCINATION: Primary | ICD-10-CM

## 2022-07-12 PROCEDURE — 0051A COVID-19, MRNA, LNP-S, PF, 30 MCG/0.3 ML DOSE VACCINE (PFIZER): CPT | Mod: PBBFAC

## 2022-07-12 PROCEDURE — 91305 COVID-19, MRNA, LNP-S, PF, 30 MCG/0.3 ML DOSE VACCINE (PFIZER): CPT | Mod: PBBFAC

## 2022-08-09 ENCOUNTER — IMMUNIZATION (OUTPATIENT)
Dept: OBSTETRICS AND GYNECOLOGY | Facility: CLINIC | Age: 42
End: 2022-08-09
Attending: FAMILY MEDICINE
Payer: MEDICAID

## 2022-08-09 DIAGNOSIS — Z23 NEED FOR VACCINATION: Primary | ICD-10-CM

## 2022-08-09 PROCEDURE — 91305 COVID-19, MRNA, LNP-S, PF, 30 MCG/0.3 ML DOSE VACCINE (PFIZER): CPT | Mod: PBBFAC

## 2022-09-08 ENCOUNTER — HOSPITAL ENCOUNTER (EMERGENCY)
Facility: HOSPITAL | Age: 42
Discharge: HOME | End: 2022-09-09
Attending: STUDENT IN AN ORGANIZED HEALTH CARE EDUCATION/TRAINING PROGRAM
Payer: MEDICAID

## 2022-09-08 DIAGNOSIS — E87.6 HYPOKALEMIA: ICD-10-CM

## 2022-09-08 DIAGNOSIS — R00.2 PALPITATIONS: ICD-10-CM

## 2022-09-08 DIAGNOSIS — R52 BODY ACHES: Primary | ICD-10-CM

## 2022-09-08 LAB
ALBUMIN SERPL BCP-MCNC: 4.1 G/DL (ref 3.5–5.2)
ALP SERPL-CCNC: 64 U/L (ref 55–135)
ALT SERPL W/O P-5'-P-CCNC: 17 U/L (ref 10–44)
ANION GAP SERPL CALC-SCNC: 10 MMOL/L (ref 8–16)
AST SERPL-CCNC: 15 U/L (ref 10–40)
BACTERIA #/AREA URNS HPF: ABNORMAL /HPF
BASOPHILS # BLD AUTO: 0.07 K/UL (ref 0–0.2)
BASOPHILS NFR BLD: 0.4 % (ref 0–1.9)
BILIRUB SERPL-MCNC: 0.8 MG/DL (ref 0.1–1)
BILIRUB UR QL STRIP: NEGATIVE
BUN SERPL-MCNC: 14 MG/DL (ref 6–20)
CALCIUM SERPL-MCNC: 9.6 MG/DL (ref 8.7–10.5)
CHLORIDE SERPL-SCNC: 100 MMOL/L (ref 95–110)
CLARITY UR: CLEAR
CO2 SERPL-SCNC: 29 MMOL/L (ref 23–29)
COLOR UR: YELLOW
CREAT SERPL-MCNC: 1.2 MG/DL (ref 0.5–1.4)
DIFFERENTIAL METHOD: ABNORMAL
EOSINOPHIL # BLD AUTO: 0.2 K/UL (ref 0–0.5)
EOSINOPHIL NFR BLD: 1 % (ref 0–8)
ERYTHROCYTE [DISTWIDTH] IN BLOOD BY AUTOMATED COUNT: 14 % (ref 11.5–14.5)
EST. GFR  (NO RACE VARIABLE): >60 ML/MIN/1.73 M^2
GLUCOSE SERPL-MCNC: 108 MG/DL (ref 70–110)
GLUCOSE UR QL STRIP: NEGATIVE
HCT VFR BLD AUTO: 43.1 % (ref 40–54)
HGB BLD-MCNC: 14.3 G/DL (ref 14–18)
HGB UR QL STRIP: ABNORMAL
IMM GRANULOCYTES # BLD AUTO: 0.08 K/UL (ref 0–0.04)
IMM GRANULOCYTES NFR BLD AUTO: 0.4 % (ref 0–0.5)
KETONES UR QL STRIP: NEGATIVE
LEUKOCYTE ESTERASE UR QL STRIP: ABNORMAL
LYMPHOCYTES # BLD AUTO: 5.1 K/UL (ref 1–4.8)
LYMPHOCYTES NFR BLD: 26.9 % (ref 18–48)
MCH RBC QN AUTO: 29.7 PG (ref 27–31)
MCHC RBC AUTO-ENTMCNC: 33.2 G/DL (ref 32–36)
MCV RBC AUTO: 90 FL (ref 82–98)
MICROSCOPIC COMMENT: ABNORMAL
MONOCYTES # BLD AUTO: 1.2 K/UL (ref 0.3–1)
MONOCYTES NFR BLD: 6.6 % (ref 4–15)
NEUTROPHILS # BLD AUTO: 12.2 K/UL (ref 1.8–7.7)
NEUTROPHILS NFR BLD: 64.7 % (ref 38–73)
NITRITE UR QL STRIP: NEGATIVE
NRBC BLD-RTO: 0 /100 WBC
PH UR STRIP: 6 [PH] (ref 5–8)
PLATELET # BLD AUTO: 315 K/UL (ref 150–450)
PMV BLD AUTO: 9.5 FL (ref 9.2–12.9)
POTASSIUM SERPL-SCNC: 3.4 MMOL/L (ref 3.5–5.1)
PROT SERPL-MCNC: 7.6 G/DL (ref 6–8.4)
PROT UR QL STRIP: NEGATIVE
RBC # BLD AUTO: 4.81 M/UL (ref 4.6–6.2)
RBC #/AREA URNS HPF: 2 /HPF (ref 0–4)
SODIUM SERPL-SCNC: 139 MMOL/L (ref 136–145)
SP GR UR STRIP: 1.01 (ref 1–1.03)
TROPONIN I SERPL DL<=0.01 NG/ML-MCNC: <0.006 NG/ML (ref 0–0.03)
UNIDENT CRYS URNS QL MICRO: ABNORMAL
URN SPEC COLLECT METH UR: ABNORMAL
UROBILINOGEN UR STRIP-ACNC: NEGATIVE EU/DL
WBC # BLD AUTO: 18.8 K/UL (ref 3.9–12.7)
WBC #/AREA URNS HPF: 12 /HPF (ref 0–5)
WBC CLUMPS URNS QL MICRO: ABNORMAL

## 2022-09-08 PROCEDURE — 81000 URINALYSIS NONAUTO W/SCOPE: CPT | Performed by: NURSE PRACTITIONER

## 2022-09-08 PROCEDURE — 84484 ASSAY OF TROPONIN QUANT: CPT | Performed by: NURSE PRACTITIONER

## 2022-09-08 PROCEDURE — 99285 EMERGENCY DEPT VISIT HI MDM: CPT | Mod: 25

## 2022-09-08 PROCEDURE — 87086 URINE CULTURE/COLONY COUNT: CPT | Performed by: NURSE PRACTITIONER

## 2022-09-08 PROCEDURE — 93005 ELECTROCARDIOGRAM TRACING: CPT

## 2022-09-08 PROCEDURE — 85025 COMPLETE CBC W/AUTO DIFF WBC: CPT | Performed by: NURSE PRACTITIONER

## 2022-09-08 PROCEDURE — 93010 ELECTROCARDIOGRAM REPORT: CPT | Mod: ,,, | Performed by: INTERNAL MEDICINE

## 2022-09-08 PROCEDURE — 25000003 PHARM REV CODE 250: Performed by: STUDENT IN AN ORGANIZED HEALTH CARE EDUCATION/TRAINING PROGRAM

## 2022-09-08 PROCEDURE — 93010 EKG 12-LEAD: ICD-10-PCS | Mod: ,,, | Performed by: INTERNAL MEDICINE

## 2022-09-08 PROCEDURE — 80053 COMPREHEN METABOLIC PANEL: CPT | Performed by: NURSE PRACTITIONER

## 2022-09-08 RX ORDER — CLONIDINE HYDROCHLORIDE 0.1 MG/1
0.2 TABLET ORAL
Status: COMPLETED | OUTPATIENT
Start: 2022-09-08 | End: 2022-09-08

## 2022-09-08 RX ADMIN — CLONIDINE HYDROCHLORIDE 0.2 MG: 0.1 TABLET ORAL at 11:09

## 2022-09-09 VITALS
HEART RATE: 59 BPM | TEMPERATURE: 99 F | SYSTOLIC BLOOD PRESSURE: 164 MMHG | DIASTOLIC BLOOD PRESSURE: 96 MMHG | BODY MASS INDEX: 33.27 KG/M2 | WEIGHT: 207 LBS | OXYGEN SATURATION: 98 % | RESPIRATION RATE: 20 BRPM | HEIGHT: 66 IN

## 2022-09-09 NOTE — ED PROVIDER NOTES
"Encounter Date: 9/8/2022    SCRIBE #1 NOTE: I, Oliverio Anders, am scribing for, and in the presence of,  Justo eHnry MD. I have scribed the following portions of the note - Other sections scribed: HPI, ROS, PE.     History     Chief Complaint   Patient presents with    Nausea     PT with intermittent nausea, palpitations and chills all day. PT quit smoking cigarettes 9/2/2022.     Nabil Alfonso is a 42 y. O male with a PMHx of HTN, that comes to the ED complaining of intermittent palpitations beginning this morning. Patient reports he drank a cup of coffee in the morning, after which he started noting intermittent complaints of palpitations, nausea, and chills, stating "he felt jittery and thought it could be anxiety". Patient notes he ate some burger dominic later in the day, after which he endorses feeling bloated and notes he had an emesis episode. The patient reports he recently quit smoking on 09/02/2022, noting he was wearing a nicotine patch this morning during onset of symptoms, which he removed around 2-3PM and noted slight relief to his complaints but they did not subside. Patient also notes some tingling to his bilateral feet today, and an episode of dark brown stool on Tuesday. Compliant with antihypertensives which he endorses taking earlier this morning. No other medications taken PTA. No alleviating or exacerbating factors noted. Denies fever, dysuria, hematuria, blood in stool, or other associated complaints. No known allergies.    The history is provided by the patient. No  was used.   Review of patient's allergies indicates:  No Known Allergies  Past Medical History:   Diagnosis Date    Hypertension      Past Surgical History:   Procedure Laterality Date    ACHILLES TENDON SURGERY      BONE MARROW BIOPSY      HERNIA REPAIR       Family History   Problem Relation Age of Onset    Hypertension Mother     Hypertension Father      Social History     Tobacco Use    Smoking status: " Former     Packs/day: 0.50     Types: Cigarettes    Smokeless tobacco: Never   Substance Use Topics    Alcohol use: Yes     Comment: occasionally    Drug use: Not Currently     Types: Marijuana     Review of Systems   Constitutional:  Positive for chills. Negative for fever.   HENT:  Negative for facial swelling and sore throat.    Eyes:  Negative for visual disturbance.   Respiratory:  Negative for cough and shortness of breath.    Cardiovascular:  Positive for palpitations. Negative for chest pain.   Gastrointestinal:  Positive for abdominal distention, nausea and vomiting. Negative for abdominal pain, blood in stool, constipation and diarrhea.   Genitourinary:  Negative for dysuria and hematuria.   Musculoskeletal:  Negative for back pain.   Skin:  Negative for rash.   Neurological:  Negative for weakness and headaches.        (+) paresthesia to bilateral feet x1   Hematological:  Does not bruise/bleed easily.   Psychiatric/Behavioral: Negative.       Physical Exam     Initial Vitals [09/08/22 2200]   BP Pulse Resp Temp SpO2   (!) 181/116 89 14 98.8 °F (37.1 °C) 99 %      MAP       --         Physical Exam    Nursing note and vitals reviewed.  Constitutional: He appears well-developed and well-nourished. He is not diaphoretic. No distress.   HENT:   Head: Normocephalic and atraumatic.   Right Ear: External ear normal.   Left Ear: External ear normal.   Nose: Nose normal.   Eyes: Conjunctivae are normal. No scleral icterus.   Neck: Neck supple. No tracheal deviation present.   Cardiovascular:  Normal rate, regular rhythm and normal heart sounds.     Exam reveals no gallop and no friction rub.       No murmur heard.  Pulses:       Radial pulses are 2+ on the right side and 2+ on the left side.        Dorsalis pedis pulses are 2+ on the right side and 2+ on the left side.   Pulmonary/Chest: Breath sounds normal. No respiratory distress.   Abdominal: Abdomen is soft. Bowel sounds are normal. There is no abdominal  tenderness.   Musculoskeletal:      Cervical back: Neck supple.      Right lower leg: No edema.      Left lower leg: No edema.     Neurological: He is alert and oriented to person, place, and time. GCS score is 15. GCS eye subscore is 4. GCS verbal subscore is 5. GCS motor subscore is 6.   Skin: Skin is warm and dry.   Psychiatric: He has a normal mood and affect. Thought content normal.       ED Course   Procedures  Labs Reviewed   CBC W/ AUTO DIFFERENTIAL - Abnormal; Notable for the following components:       Result Value    WBC 18.80 (*)     Gran # (ANC) 12.2 (*)     Immature Grans (Abs) 0.08 (*)     Lymph # 5.1 (*)     Mono # 1.2 (*)     All other components within normal limits   COMPREHENSIVE METABOLIC PANEL - Abnormal; Notable for the following components:    Potassium 3.4 (*)     All other components within normal limits   URINALYSIS, REFLEX TO URINE CULTURE - Abnormal; Notable for the following components:    Occult Blood UA Trace (*)     Leukocytes, UA 2+ (*)     All other components within normal limits    Narrative:     Specimen Source->Urine   URINALYSIS MICROSCOPIC - Abnormal; Notable for the following components:    WBC, UA 12 (*)     All other components within normal limits    Narrative:     Specimen Source->Urine   CULTURE, URINE   TROPONIN I          Imaging Results              X-Ray Chest PA And Lateral (Final result)  Result time 09/09/22 01:07:21      Final result by Francia De L aGarza MD (09/09/22 01:07:21)                   Impression:      Mild cardiomegaly.  No radiographic evidence of acute intrathoracic abnormality.      Electronically signed by: Francia De La Garza MD  Date:    09/09/2022  Time:    01:07               Narrative:    EXAMINATION:  XR CHEST PA AND LATERAL    CLINICAL HISTORY:  palpitations;    TECHNIQUE:  PA and lateral views of the chest were performed.    COMPARISON:  12/09/2021    FINDINGS:  Cardiac monitoring leads overlie the chest.  There is unchanged prominence of the  cardiomediastinal silhouette.  Mediastinal structures are midline.  The lungs are symmetrically expanded without evidence of confluent airspace consolidation.  Fluid or pneumothorax identified.  Osseous structures intact.                                       Medications   cloNIDine tablet 0.2 mg (0.2 mg Oral Given 9/8/22 2324)     Medical Decision Making:   History:   Old Medical Records: I decided to obtain old medical records.  Clinical Tests:   Lab Tests: Ordered and Reviewed  Radiological Study: Ordered and Reviewed  Medical Tests: Ordered and Reviewed        Scribe Attestation:   Scribe #1: I performed the above scribed service and the documentation accurately describes the services I performed. I attest to the accuracy of the note.        ED Course as of 09/09/22 0122   Thu Sep 08, 2022   2223 EKG:  Rate 69, regular rhythm, sinus rhythm, LVH with repolarization abnormalities, similar to previous morphology, no ST elevations or depressions noted. [CC]   2312 Patient with noted chronic leukocytosis.  18.8 today. [CC]   Fri Sep 09, 2022   0117 UA not indicative of UTI.  Patient denies dysuria.  Blood pressure improved on re-evaluation.  Repeat blood pressure 163/96.  Patient notes improvement in symptoms.  Concerned that symptoms may be related to patient's new nicotine patch regimen in conjunction with caffeine use this morning.  Chest x-ray is without acute cardiopulmonary process.  Leukocytosis which appears to be chronic.  Patient will need to have this followed up by his primary care physician.  I have counseled patient on this.  Mild hypokalemia.  Counseled on potassium rich foods.  Patient counseled on the need follow-up in a proper return ER precautions.  Chest x-ray is nonischemic.  He denies chest francia.  Troponin normal.  Heart score 1.  Doubt ACS.  Again patient counseled to follow-up.  Plan for discharge. [CC]   0119 Doubt electrolyte derangement as cause of palpitations.  Again likely nicotine and  caffeine given timing of symptoms. [CC]      ED Course User Index  [CC] Justo Henry MD             Clinical Impression:   Final diagnoses:  [R00.2] Palpitations  [R52] Body aches (Primary)  [E87.6] Hypokalemia        ED Disposition Condition    Discharge Stable        I, Justo Henry MD, personally performed the services described in this documentation. All medical record entries made by the scribe were at my direction and in my presence. I have reviewed the chart and agree that the record reflects my personal performance and is accurate and complete.    ED Prescriptions    None       Follow-up Information       Follow up With Specialties Details Why Contact Info    [facility] Emergency Medicine Go to  If symptoms worsen [address]  [facility] [address] [phone]    Your PCP  Schedule an appointment as soon as possible for a visit                Justo Henry MD  09/09/22 0127

## 2022-09-09 NOTE — ED TRIAGE NOTES
Pt presents to ED c/o intermittent nausea (reports nausea isnt present at the moment), palpitations and chills all day. Pt reports that he quit smoking cigarettes 9/2/2022. Pt is also hypertensive, reports that he's compliant with his BP meds. Pt denies vomiting, diarrhea, fever,chills, and pain, HA, numbness, tingling. Pt is alert, calm, and oriented x4, placed on cardiac monitoring and continuous pulse ox, 99% on RA.

## 2022-09-09 NOTE — FIRST PROVIDER EVALUATION
"Medical screening examination initiated.  I have conducted a focused provider triage encounter, findings are as follows:    Brief history of present illness:  nausea, palpitations, and chills today; quit smoking on 9/2/2022    Vitals:    09/08/22 2200   BP: (!) 181/116   BP Location: Left arm   Patient Position: Sitting   Pulse: 89   Resp: 14   Temp: 98.8 °F (37.1 °C)   TempSrc: Oral   SpO2: 99%   Weight: 93.9 kg (207 lb)   Height: 5' 6" (1.676 m)       Pertinent physical exam:  NAD    Brief workup plan:  labs, EKG, CXR, UA    Preliminary workup initiated; this workup will be continued and followed by the physician or advanced practice provider that is assigned to the patient when roomed.  "
none

## 2022-09-09 NOTE — DISCHARGE INSTRUCTIONS
Continue taking your blood pressure medication as prescribed.  Your potassium was slightly low today.  Make sure to eat a high potassium diet.  Please follow-up with your doctor regarding her palpitations although I believe they were likely related to nicotine and caffeine.      Thank you for coming to our Emergency Department today. It is important to remember that some problems or medical conditions are difficult to diagnose and may not be found during your Emergency Department visit.     Be sure to follow up with your primary care doctor and review all labs/imaging/tests that were performed during your ER visit with them. Some labs/tests may be outside of the normal range and require non-emergent follow-up and further investigation to help diagnose/exclude/prevent complications or other potentially serious medical conditions that were not addressed during your ER visit.    If you do not have a primary care doctor, you may contact the one listed on your discharge paperwork or you may also call the [facility] Appointment Desk at [phone] to schedule an appointment and establish care with one. It is important to your health that you have a primary care doctor.    Please take all medications as directed. All medications may potentially have side-effects and it is impossible to predict which medications may give you side-effects or what side-effects (if any) they will give you.. If you feel that you are having a negative effect or side-effect of any medication you should immediately stop taking them and seek medical attention. If you feel that you are having a life-threatening reaction call 911.    Return to the ER with any questions/concerns, new/concerning symptoms, worsening or failure to improve.     Do not drive, swim, climb to height, take a bath, operate heavy machinery, drink alcohol or take potentially sedating medications, sign any legal documents or make any important decisions for 24 hours if you  have received any pain medications, sedatives or mood altering drugs during your ER visit or within 24 hours of taking them if they have been prescribed to you.     You can find additional resources for Dentists, hearing aids, durable medical equipment, low cost pharmacies and other resources at [email or web address]    BELOW THIS LINE ONLY APPLIES IF YOU HAVE A COVID TEST PENDING OR IF YOU HAVE BEEN DIAGNOSED WITH COVID:  Please access [facility] to review the results of your test. Until the results of your COVID test return, you should isolate yourself so as not to potentially spread illness to others.   If your COVID test returns positive, you should isolate yourself so as not to spread illness to others. After five full days, if you are feeling better and you have not had fever for 24 hours, you can return to your typical daily activities, but you must wear a mask around others for an additional 5 days.   If your COVID test returns negative and you are either unvaccinated or more than six months out from your two-dose vaccine and are not yet boosted, you should still quarantine for 5 full days followed by strict mask use for an additional 5 full days.   If your COVID test returns negative and you have received your 2-dose initial vaccine as well as a booster, you should continue strict mask use for 10 full days after the exposure.  For all those exposed, best practice includes a test at day 5 after the exposure. This can be a home test or a test through one of the many testing centers throughout our community.   Masking is always advised to limit the spread of COVID. Cdc.gov is an excellent site to obtain the latest up to date recommendations regarding COVID and COVID testing.     CDC Testing and Quarantine Guidelines for patients with exposure to a known-positive COVID-19 person:  A close exposure is defined as anyone who has had an exposure (masked or unmasked) to a known COVID -19 positive person within 6  feet of someone for a cumulative total of 15 minutes or more over a 24-hour period.   Vaccinated and/or if you recently had a positive covid test within 90 days do NOT need to quarantine after contact with someone who had COVID-19 unless you develop symptoms.   Fully vaccinated people who have not had a positive test within 90 days, should get tested 3-5 days after their exposure, even if they don't have symptoms and wear a mask indoors in public for 14 days following exposure or until their test result is negative.      Unvaccinated and/or NOT had a positive test within 90 days and meet close exposure  You are required by CDC guidelines to quarantine for at least 5 days from time of exposure followed by 5 days of strict masking. It is recommended, but not required to test after 5 days, unless you develop symptoms, in which case you should test at that time.  If you get tested after 5 days and your test is positive, your 5 day period of isolation starts the day of the positive test.    If your exposure does not meet the above definition, you can return to your normal daily activities to include social distancing, wearing a mask and frequent handwashing.      Here is a link to guidance from the CDC:  https://www.cdc.gov/media/releases/2021/s1227-isolation-quarantine-guidance.html      [facility] Testing Sites:  https://ldh.[place].gov/page/3934      [facility] website with testing locations and guidance:  [email or web address]

## 2022-09-10 LAB — BACTERIA UR CULT: NO GROWTH

## 2022-10-29 ENCOUNTER — HOSPITAL ENCOUNTER (EMERGENCY)
Facility: HOSPITAL | Age: 42
Discharge: HOME | End: 2022-10-29
Attending: EMERGENCY MEDICINE
Payer: MEDICAID

## 2022-10-29 VITALS
OXYGEN SATURATION: 100 % | RESPIRATION RATE: 16 BRPM | SYSTOLIC BLOOD PRESSURE: 160 MMHG | DIASTOLIC BLOOD PRESSURE: 90 MMHG | BODY MASS INDEX: 33.74 KG/M2 | HEART RATE: 110 BPM | WEIGHT: 215 LBS | TEMPERATURE: 98 F | HEIGHT: 67 IN

## 2022-10-29 DIAGNOSIS — S89.91XA RIGHT KNEE INJURY: ICD-10-CM

## 2022-10-29 DIAGNOSIS — S80.01XA CONTUSION OF RIGHT KNEE, INITIAL ENCOUNTER: Primary | ICD-10-CM

## 2022-10-29 PROCEDURE — 99283 EMERGENCY DEPT VISIT LOW MDM: CPT

## 2022-10-29 RX ORDER — NAPROXEN 500 MG/1
500 TABLET ORAL EVERY 12 HOURS PRN
Qty: 20 TABLET | Refills: 0 | Status: SHIPPED | OUTPATIENT
Start: 2022-10-29 | End: 2023-01-31

## 2022-10-29 RX ORDER — KETOROLAC TROMETHAMINE 30 MG/ML
30 INJECTION, SOLUTION INTRAMUSCULAR; INTRAVENOUS
Status: DISCONTINUED | OUTPATIENT
Start: 2022-10-29 | End: 2022-10-29 | Stop reason: HOSPADM

## 2022-10-29 RX ORDER — HYDROCODONE BITARTRATE AND ACETAMINOPHEN 5; 325 MG/1; MG/1
1 TABLET ORAL EVERY 4 HOURS PRN
Qty: 6 TABLET | Refills: 0 | Status: SHIPPED | OUTPATIENT
Start: 2022-10-29 | End: 2023-01-31
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
## 2022-10-29 NOTE — DISCHARGE INSTRUCTIONS

## 2023-01-23 ENCOUNTER — DOCUMENTATION ONLY (OUTPATIENT)
Dept: REHABILITATION | Facility: HOSPITAL | Age: 43
End: 2023-01-23

## 2023-01-23 NOTE — PROGRESS NOTES
Patient no showed physical therapy initial evaluation on 1/23/23.This is his first no show for this referral.     Dior Padron, PT, DPT  01/23/2023

## 2023-01-31 ENCOUNTER — HOSPITAL ENCOUNTER (EMERGENCY)
Facility: HOSPITAL | Age: 43
Discharge: HOME | End: 2023-01-31
Attending: EMERGENCY MEDICINE
Payer: MEDICAID

## 2023-01-31 VITALS
TEMPERATURE: 98 F | BODY MASS INDEX: 31.86 KG/M2 | RESPIRATION RATE: 20 BRPM | OXYGEN SATURATION: 99 % | DIASTOLIC BLOOD PRESSURE: 96 MMHG | HEIGHT: 67 IN | HEART RATE: 61 BPM | WEIGHT: 203 LBS | SYSTOLIC BLOOD PRESSURE: 149 MMHG

## 2023-01-31 DIAGNOSIS — R03.0 ELEVATED BLOOD PRESSURE READING: ICD-10-CM

## 2023-01-31 DIAGNOSIS — D72.829 LEUKOCYTOSIS, UNSPECIFIED TYPE: ICD-10-CM

## 2023-01-31 DIAGNOSIS — R07.81 RIB PAIN ON RIGHT SIDE: Primary | ICD-10-CM

## 2023-01-31 LAB
ALBUMIN SERPL-MCNC: 4.1 G/DL (ref 3.3–5.5)
ALLENS TEST: ABNORMAL
ALLENS TEST: ABNORMAL
ALP SERPL-CCNC: 69 U/L (ref 42–141)
BILIRUB SERPL-MCNC: 0.8 MG/DL (ref 0.2–1.6)
BILIRUBIN, POC UA: NEGATIVE
BLOOD, POC UA: ABNORMAL
BUN SERPL-MCNC: 14 MG/DL (ref 7–22)
CALCIUM SERPL-MCNC: 10.3 MG/DL (ref 8–10.3)
CHLORIDE SERPL-SCNC: 105 MMOL/L (ref 98–108)
CLARITY, POC UA: CLEAR
COLOR, POC UA: YELLOW
CREAT SERPL-MCNC: 1.1 MG/DL (ref 0.6–1.2)
GLUCOSE SERPL-MCNC: 83 MG/DL (ref 73–118)
GLUCOSE, POC UA: NEGATIVE
HCO3 UR-SCNC: 27.3 MMOL/L (ref 24–28)
HCO3 UR-SCNC: 29.1 MMOL/L (ref 24–28)
KETONES, POC UA: ABNORMAL
LDH SERPL L TO P-CCNC: 0.88 MMOL/L (ref 0.5–2.2)
LDH SERPL L TO P-CCNC: 1.08 MMOL/L (ref 0.5–2.2)
LEUKOCYTE EST, POC UA: NEGATIVE
NITRITE, POC UA: NEGATIVE
PCO2 BLDA: 45.6 MMHG (ref 35–45)
PCO2 BLDA: 48.1 MMHG (ref 35–45)
PH SMN: 7.38 [PH] (ref 7.35–7.45)
PH SMN: 7.39 [PH] (ref 7.35–7.45)
PH UR STRIP: 7 [PH]
PO2 BLDA: 25 MMHG (ref 40–60)
PO2 BLDA: 31 MMHG (ref 40–60)
POC ALT (SGPT): 22 U/L (ref 10–47)
POC AST (SGOT): 25 U/L (ref 11–38)
POC BE: 2 MMOL/L
POC BE: 3 MMOL/L
POC CARDIAC TROPONIN I: 0 NG/ML (ref 0–0.08)
POC RAPID STREP A: NEGATIVE
POC SATURATED O2: 43 % (ref 95–100)
POC SATURATED O2: 59 % (ref 95–100)
POC TCO2: 29 MMOL/L (ref 24–29)
POC TCO2: 30 MMOL/L (ref 18–33)
POC TCO2: 31 MMOL/L (ref 24–29)
POTASSIUM BLD-SCNC: 4.1 MMOL/L (ref 3.6–5.1)
PROTEIN, POC UA: NEGATIVE
PROTEIN, POC: 8 G/DL (ref 6.4–8.1)
SAMPLE: ABNORMAL
SAMPLE: ABNORMAL
SAMPLE: NORMAL
SITE: ABNORMAL
SITE: ABNORMAL
SODIUM BLD-SCNC: 143 MMOL/L (ref 128–145)
SPECIFIC GRAVITY, POC UA: 1.02
UROBILINOGEN, POC UA: 1 E.U./DL

## 2023-01-31 PROCEDURE — 99285 EMERGENCY DEPT VISIT HI MDM: CPT | Mod: 25,ER

## 2023-01-31 PROCEDURE — 82803 BLOOD GASES ANY COMBINATION: CPT | Mod: ER

## 2023-01-31 PROCEDURE — 93005 ELECTROCARDIOGRAM TRACING: CPT | Mod: ER

## 2023-01-31 PROCEDURE — 63600175 PHARM REV CODE 636 W HCPCS: Mod: ER | Performed by: NURSE PRACTITIONER

## 2023-01-31 PROCEDURE — 85025 COMPLETE CBC W/AUTO DIFF WBC: CPT | Mod: ER

## 2023-01-31 PROCEDURE — 81003 URINALYSIS AUTO W/O SCOPE: CPT | Mod: ER

## 2023-01-31 PROCEDURE — 87040 BLOOD CULTURE FOR BACTERIA: CPT | Performed by: NURSE PRACTITIONER

## 2023-01-31 PROCEDURE — 25000003 PHARM REV CODE 250: Mod: ER | Performed by: NURSE PRACTITIONER

## 2023-01-31 PROCEDURE — 93010 ELECTROCARDIOGRAM REPORT: CPT | Mod: ,,, | Performed by: INTERNAL MEDICINE

## 2023-01-31 PROCEDURE — 80053 COMPREHEN METABOLIC PANEL: CPT | Mod: ER

## 2023-01-31 PROCEDURE — 93010 EKG 12-LEAD: ICD-10-PCS | Mod: ,,, | Performed by: INTERNAL MEDICINE

## 2023-01-31 PROCEDURE — 96361 HYDRATE IV INFUSION ADD-ON: CPT | Mod: 59,ER

## 2023-01-31 PROCEDURE — 87086 URINE CULTURE/COLONY COUNT: CPT | Performed by: NURSE PRACTITIONER

## 2023-01-31 PROCEDURE — 96365 THER/PROPH/DIAG IV INF INIT: CPT | Mod: 59,ER

## 2023-01-31 PROCEDURE — 84484 ASSAY OF TROPONIN QUANT: CPT | Mod: ER

## 2023-01-31 PROCEDURE — 25500020 PHARM REV CODE 255: Mod: ER | Performed by: NURSE PRACTITIONER

## 2023-01-31 RX ORDER — AMLODIPINE BESYLATE 5 MG/1
10 TABLET ORAL DAILY
COMMUNITY
Start: 2023-01-26

## 2023-01-31 RX ORDER — TIZANIDINE 2 MG/1
2 TABLET ORAL EVERY 8 HOURS PRN
Qty: 15 TABLET | Refills: 0 | Status: SHIPPED | OUTPATIENT
Start: 2023-01-31

## 2023-01-31 RX ORDER — CEFTRIAXONE 2 G/50ML
2 INJECTION, SOLUTION INTRAVENOUS
Status: COMPLETED | OUTPATIENT
Start: 2023-01-31 | End: 2023-01-31

## 2023-01-31 RX ORDER — LOSARTAN POTASSIUM 100 MG/1
100 TABLET ORAL DAILY
COMMUNITY
Start: 2023-01-26

## 2023-01-31 RX ORDER — NAPROXEN 500 MG/1
500 TABLET ORAL EVERY 12 HOURS PRN
Qty: 20 TABLET | Refills: 0 | Status: SHIPPED | OUTPATIENT
Start: 2023-01-31

## 2023-01-31 RX ADMIN — IOHEXOL 100 ML: 350 INJECTION, SOLUTION INTRAVENOUS at 07:01

## 2023-01-31 RX ADMIN — SODIUM CHLORIDE 1000 ML: 9 INJECTION, SOLUTION INTRAVENOUS at 04:01

## 2023-01-31 RX ADMIN — CEFTRIAXONE 2 G: 2 INJECTION, SOLUTION INTRAVENOUS at 07:01

## 2023-01-31 RX ADMIN — SODIUM CHLORIDE, POTASSIUM CHLORIDE, SODIUM LACTATE AND CALCIUM CHLORIDE 1763 ML: 600; 310; 30; 20 INJECTION, SOLUTION INTRAVENOUS at 06:01

## 2023-01-31 NOTE — Clinical Note
"Nabil Perez" [place] was seen and treated in our emergency department on 1/31/2023.  He may return to school on 02/06/2023.      If you have any questions or concerns, please don't hesitate to call.      Fe Webster RN"

## 2023-01-31 NOTE — ED PROVIDER NOTES
Encounter Date: 1/31/2023    SCRIBE #1 NOTE: I, Digna Gary, am scribing for, and in the presence of,  Neal Rodriguez NP. I have scribed the following portions of the note - Other sections scribed: NAN DUMONT.     History     Chief Complaint   Patient presents with    Flank Pain     Pt states right flank pain x3 days pt denies GI/ changes denies N/V/D     CC: Flank pain    Nabil Alfonso is a 42 y.o. male with a past medical history of HTN, presents to the ED, via private vehicle, with a chief complaint of right flank pain, onset 1 day ago. Patient reports pain is exacerbated with sitting still, movement does not. He reports the pain was constant and exacerbated yesterday, but today the pain is intermittent. He further reports with coughing, he feels some discomfort but it is not excruciating. He denies taking any medication to alleviate symptoms today. No other alleviating or exacerbating factors noted. Patient denies abdominal pain, nausea, vomiting, chest pain, cough, fever, dysuria, hematuria, or any other associated symptoms. He also denies any history of cardiac or pulmonary complications. Per chart, patient has NKDA.    The history is provided by the patient and medical records. No  was used.   Review of patient's allergies indicates:  No Known Allergies  Past Medical History:   Diagnosis Date    Hypertension      Past Surgical History:   Procedure Laterality Date    ACHILLES TENDON SURGERY      BONE MARROW BIOPSY      HERNIA REPAIR       Family History   Problem Relation Age of Onset    Hypertension Mother     Hypertension Father      Social History     Tobacco Use    Smoking status: Former     Packs/day: 0.50     Types: Cigarettes    Smokeless tobacco: Never   Substance Use Topics    Alcohol use: Yes     Comment: occasionally    Drug use: Not Currently     Types: Marijuana     Review of Systems   Constitutional:  Negative for chills and fever.   HENT:  Negative for congestion, rhinorrhea  and sore throat.    Eyes:  Negative for visual disturbance.   Respiratory:  Negative for cough and shortness of breath.    Cardiovascular:  Negative for chest pain.   Gastrointestinal:  Negative for abdominal pain, blood in stool, constipation, diarrhea, nausea and vomiting.   Genitourinary:  Positive for flank pain (Right flank). Negative for dysuria, frequency, hematuria and urgency.   Musculoskeletal:  Negative for arthralgias, back pain, joint swelling, myalgias and neck pain.   Skin:  Negative for color change, rash and wound.   Neurological:  Negative for dizziness, weakness, numbness and headaches.     Physical Exam     Initial Vitals [01/31/23 1451]   BP Pulse Resp Temp SpO2   (!) 159/96 76 20 99.2 °F (37.3 °C) 100 %      MAP       --         Physical Exam    Nursing note and vitals reviewed.  Constitutional: He appears well-developed and well-nourished. He is not diaphoretic. No distress.   HENT:   Head: Normocephalic and atraumatic.   Right Ear: External ear normal.   Left Ear: External ear normal.   Nose: Nose normal.   Eyes: EOM are normal. Right eye exhibits no discharge. Left eye exhibits no discharge.   Neck: Neck supple. No tracheal deviation present.   Normal range of motion.  Cardiovascular:  Normal rate.           Pulmonary/Chest: No stridor. No respiratory distress.   Abdominal: Abdomen is soft. He exhibits no distension. There is no abdominal tenderness.   Musculoskeletal:         General: No tenderness. Normal range of motion.      Cervical back: Normal range of motion and neck supple.      Comments: Mild tenderness overlying the right ribs.  No bony step-off, deformity, bruising, erythema, warmth, rash, wound, or other abnormality.  Chest rise even and symmetrical with respirations.     Neurological: He is alert and oriented to person, place, and time. He has normal strength. No cranial nerve deficit.   Skin: Skin is warm and dry.   Psychiatric: He has a normal mood and affect. His behavior is  normal. Judgment and thought content normal.       ED Course   Procedures  Labs Reviewed   POCT URINALYSIS W/O SCOPE - Abnormal; Notable for the following components:       Result Value    Ketones, UA Trace (*)     Blood, UA 1+ (*)     All other components within normal limits   ISTAT PROCEDURE - Abnormal; Notable for the following components:    POC PCO2 48.1 (*)     POC PO2 25 (*)     POC HCO3 29.1 (*)     POC SATURATED O2 43 (*)     POC TCO2 31 (*)     All other components within normal limits   ISTAT PROCEDURE - Abnormal; Notable for the following components:    POC PCO2 45.6 (*)     POC PO2 31 (*)     POC SATURATED O2 59 (*)     All other components within normal limits   CULTURE, BLOOD    Narrative:     Aerobic and anaerobic   CULTURE, BLOOD    Narrative:     Aerobic and anaerobic   CULTURE, URINE    Narrative:     Indicated criteria for high risk culture:->Other  Other (specify):->infection   TROPONIN ISTAT   POCT URINALYSIS W/O SCOPE   POCT CBC   POCT URINALYSIS W/O SCOPE   POCT CMP   POCT TROPONIN   POCT CMP   POCT STREP A, RAPID     EKG Readings: (Independently Interpreted)   Rhythm: Sinus Bradycardia. Heart Rate: 58. Ectopy: No Ectopy. Conduction: Normal. ST Segments: Normal ST Segments. T Waves: Normal. Clinical Impression: Normal Sinus Rhythm and Sinus Bradycardia   ECG Results              EKG 12-lead (Final result)  Result time 02/01/23 17:43:14      Final result by Interface, Lab In [facility] (02/01/23 17:43:14)                   Narrative:    Test Reason : R07.81,    Vent. Rate : 058 BPM     Atrial Rate : 058 BPM     P-R Int : 154 ms          QRS Dur : 080 ms      QT Int : 408 ms       P-R-T Axes : 041 -07 -04 degrees     QTc Int : 400 ms    Sinus bradycardia  Moderate voltage criteria for LVH, may be normal variant ( R in aVL ,   [place] product )  Borderline Abnormal ECG  When compared with ECG of 08-SEP-2022 22:14,  T wave inversion less evident in Inferior leads  T wave inversion no longer evident  in Lateral leads  Confirmed by Ricky Dale MD ([id number]) on 2/1/2023 5:43:03 PM    Referred By: [email or web address]   SELF           Confirmed By:Ricky Dale MD                                  Imaging Results              CT Chest Abdomen Pelvis With Contrast (xpd) (Final result)  Result time 01/31/23 20:21:36      Final result by Vickie Soto MD (01/31/23 20:21:36)                   Impression:      No acute abdominal or pelvic pathology detected on CT of the chest abdomen and pelvis with contrast.    Mild left adrenal hyperplasia.    Tiny fat containing umbilical and supraumbilical hernias.    Mild colonic diverticulosis.      Electronically signed by: Vickie Soto  Date:    01/31/2023  Time:    20:21               Narrative:    EXAMINATION:  CT OF CHEST ABDOMEN PELVIS WITH    CLINICAL HISTORY:  Right flank pain x3 days.  Coughing.  Pain exacerbated by sitting still.    TECHNIQUE:  5 mm enhanced axial images were obtained from the lung apices through the greater trochanters.  100 mL of Omnipaque 300 was injected.    COMPARISON:  CT renal stone 01/31/2023    FINDINGS:  In the chest, there is no focal consolidation, pulmonary contusion, or pneumothorax. There are no displaced rib fractures. There is no pleural or pericardial effusion. The aorta is intact. The heart size is within normal limits.    In the abdomen, the liver, spleen, pancreas, kidneys, right adrenal gland and gallbladder are unremarkable.  There is no pneumoperitoneum or free fluid detected.  There is mild hyperplasia of the left adrenal gland.  There is a tiny fat containing umbilical hernia.  There is also a tiny supraumbilical fat containing hernia.    In the pelvis, mild colonic diverticulosis is present.  There is moderate stool.  There is no free fluid.  The appendix is not inflamed.  A small right fat containing inguinal hernia is present.  Mild degenerative changes of the spine and the sacroiliac joints are present.  A sclerotic focus is  seen in the right femoral neck, which may represent a bone island.                                       X-Ray Chest PA And Lateral (Final result)  Result time 01/31/23 17:48:14      Final result by Thomas Tovar MD (01/31/23 17:48:14)                   Impression:      No acute process.      Electronically signed by: Thomas Tovar MD  Date:    01/31/2023  Time:    17:48               Narrative:    EXAMINATION:  XR CHEST PA AND LATERAL    CLINICAL HISTORY:  Pleurodynia    TECHNIQUE:  PA and lateral views of the chest were performed.    COMPARISON:  09/09/2022.    FINDINGS:  The trachea is unremarkable.  The cardiomediastinal silhouette is within normal limits.  The hemidiaphragms are unremarkable.  There are no pleural effusions.  There is no evidence of a pneumothorax.  There is no evidence of pneumomediastinum.  No airspace opacity is present.  The osseous structures are unremarkable.                                       CT Renal Stone Study ABD Pelvis WO (Final result)  Result time 01/31/23 17:27:26      Final result by Tramaine Morrow MD (01/31/23 17:27:26)                   Impression:      1. No findings to suggest obstructive uropathy noting motion artifact.  2. Colonic diverticulosis without diverticulitis.  3. Please see above for several additional findings.      Electronically signed by: Tramaine Morrow MD  Date:    01/31/2023  Time:    17:27               Narrative:    EXAMINATION:  CT RENAL STONE STUDY ABD PELVIS WO    CLINICAL HISTORY:  Flank pain, kidney stone suspected;    TECHNIQUE:  Low dose axial images, sagittal and coronal reformations were obtained from the lung bases to the pubic symphysis.  Contrast was not administered.    COMPARISON:  01/16/2020    FINDINGS:  Images of the lower thorax are remarkable for minimal dependent atelectasis.    Allowing for motion artifact, the liver, spleen, pancreas, gallbladder and right adrenal gland are unremarkable.  There is low attenuating thickening of  the left adrenal gland, nonspecific.  The stomach is decompressed without wall thickening.  There is no biliary dilation or ascites.    The kidneys have a grossly unremarkable noncontrast appearance without hydronephrosis or nephrolithiasis.  The bilateral ureters are unremarkable without calculi seen.  The urinary bladder is nondistended.  The prostate is not enlarged.    There are a few scattered colonic diverticula without inflammation.  The terminal ileum and appendix are unremarkable.  The small bowel is grossly unremarkable.  There are a few scattered shotty periaortic and paracaval lymph nodes.  No focal organized pelvic fluid collection.  There is atherosclerotic calcification of the aorta and its branches.    There is a suspected bone island within the right femoral neck.  There are degenerative changes of the bilateral sacroiliac joints.                                       Medications   sodium chloride 0.9% bolus 1,000 mL 1,000 mL (0 mLs Intravenous Stopped 1/31/23 1813)   lactated ringers bolus 1,763 mL (0 mLs Intravenous Stopped 1/31/23 2003)   cefTRIAXone 2 gram/50 mL IVPB 2 g (0 g Intravenous Stopped 1/31/23 2003)   iohexoL (OMNIPAQUE 350) injection 100 mL (100 mLs Intravenous Given 1/31/23 1919)     Medical Decision Making:   History:   Old Medical Records: I decided to obtain old medical records.  Clinical Tests:   Lab Tests: Ordered and Reviewed  Radiological Study: Reviewed and Ordered  ED Management:  HPI and physical exam as above.  There is mild tenderness overlying the right ribs.  No other abnormalities on physical exam.  Patient is afebrile, nontoxic, very well-appearing, and in no distress.  Labs with leukocytosis of over 20,000. Concerning for possible infection.  Urinalysis unremarkable.  CMP without concerning abnormalities.  Lactic acid within normal limits and there is no metabolic acidosis on blood gas.  No evidence of sepsis at this time.  Blood and urine cultures in process out of  an abundance of caution.  Imaging without evidence of pneumonia, intra-abdominal infectious process, or other emergent pathology.  Uncertain etiology of leukocytosis.  On chart review patient does appear to have had leukocytosis consistently in the past.  Upon further discussion patient states that he has had bone marrow testing and other investigations performed in the past without any findings as far as he is aware of.  Advised him to follow-up with his PCP and possibly Heme-Onc although all  pertinent testing may have been performed in the past.  ED return precautions given.  Patient and family member expressed understanding of diagnosis and discharge instructions.        Scribe Attestation:   Scribe #1: I performed the above scribed service and the documentation accurately describes the services I performed. I attest to the accuracy of the note.                   Clinical Impression:   Final diagnoses:  [R07.81] Rib pain on right side (Primary)  [D72.829] Leukocytosis, unspecified type  [R03.0] Elevated blood pressure reading        ED Disposition Condition    Discharge Stable        I, Neal Rodriguez NP, personally performed the services described in this documentation. All medical record entries made by the scribe were at my direction and in my presence. I have reviewed the chart and agree that the record reflects my personal performance and is accurate and complete.     ED Prescriptions       Medication Sig Dispense Start Date End Date Auth. Provider    naproxen (NAPROSYN) 500 MG tablet Take 1 tablet (500 mg total) by mouth every 12 (twelve) hours as needed (Pain). 20 tablet 1/31/2023 -- Neal Rodriguez NP    tiZANidine (ZANAFLEX) 2 MG tablet Take 1 tablet (2 mg total) by mouth every 8 (eight) hours as needed (Muscle Spasms). 15 tablet 1/31/2023 -- Neal Rodriguez NP          Follow-up Information       Follow up With Specialties Details Why Contact Info    [facility]  Schedule an  appointment as soon as possible for a visit  For further evaluation [address] [place] [address]  [phone]      [facility] ED Emergency Medicine Go to  If symptoms worsen, As needed [address] [facility] [address] [phone]             Neal Rodriguez NP  02/02/23 3722

## 2023-02-01 NOTE — ED NOTES
TOOK OVER CARE OF PT. A/A/O, DENIES CP, SOB, DIZZINESS. INFORMED OF PENDING CT SCAN AND ABX. QUESTIONS ANSWERED. VISITOR AT BEDSIDE.

## 2023-02-01 NOTE — ED NOTES
PT GIVEN DISCHARGE INSTRUCTIONS INCLUDING RX AND FOLLOW UP. VERBALIZES UNDERSTANDING. VSS, NO S/S OF ACUTE DISTRESS NOTED. AMBULATORY TO DISCHARGE WINDOW W/O ASSIT

## 2023-02-01 NOTE — DISCHARGE INSTRUCTIONS
Follow up with your regular doctor as discussed.     Return to the emergency department for any new or worsening symptoms.    Thank you for coming to our Emergency Department today. It is important to remember that some problems are difficult to diagnose and may not be found during your first visit. Be sure to follow up with your primary care doctor.  If you do not have one, you may contact the one listed on your discharge paperwork or you may also call the [facility] Appointment Desk at [phone] to schedule an appointment with one.     Return to the ER with any questions/concerns, new/concerning symptoms, worsening or failure to improve. Do not drive or make any important decisions for 24 hours if you have received any pain medications, sedatives or mood altering drugs during your ER visit.

## 2023-02-03 LAB — BACTERIA UR CULT: NO GROWTH

## 2023-02-05 LAB
BACTERIA BLD CULT: NORMAL
BACTERIA BLD CULT: NORMAL

## 2023-02-15 ENCOUNTER — CLINICAL SUPPORT (OUTPATIENT)
Dept: REHABILITATION | Facility: HOSPITAL | Age: 43
End: 2023-02-15
Payer: MEDICAID

## 2023-02-15 DIAGNOSIS — Z74.09 DECREASED STRENGTH, ENDURANCE, AND MOBILITY: ICD-10-CM

## 2023-02-15 DIAGNOSIS — R68.89 DECREASED STRENGTH, ENDURANCE, AND MOBILITY: ICD-10-CM

## 2023-02-15 DIAGNOSIS — R29.3 POSTURAL IMBALANCE: ICD-10-CM

## 2023-02-15 DIAGNOSIS — R53.1 DECREASED STRENGTH, ENDURANCE, AND MOBILITY: ICD-10-CM

## 2023-02-15 PROCEDURE — 97110 THERAPEUTIC EXERCISES: CPT | Mod: PN

## 2023-02-15 PROCEDURE — 97161 PT EVAL LOW COMPLEX 20 MIN: CPT | Mod: PN
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
## 2023-03-01 ENCOUNTER — CLINICAL SUPPORT (OUTPATIENT)
Dept: REHABILITATION | Facility: HOSPITAL | Age: 43
End: 2023-03-01
Payer: MEDICAID

## 2023-03-01 DIAGNOSIS — R68.89 DECREASED STRENGTH, ENDURANCE, AND MOBILITY: Primary | ICD-10-CM

## 2023-03-01 DIAGNOSIS — R29.3 POSTURAL IMBALANCE: ICD-10-CM

## 2023-03-01 DIAGNOSIS — R53.1 DECREASED STRENGTH, ENDURANCE, AND MOBILITY: Primary | ICD-10-CM

## 2023-03-01 DIAGNOSIS — Z74.09 DECREASED STRENGTH, ENDURANCE, AND MOBILITY: Primary | ICD-10-CM

## 2023-03-01 PROCEDURE — 97140 MANUAL THERAPY 1/> REGIONS: CPT | Mod: PN,CQ

## 2023-03-01 PROCEDURE — 97110 THERAPEUTIC EXERCISES: CPT | Mod: PN,CQ

## 2023-03-01 NOTE — PROGRESS NOTES
"[facility]   Physical Therapy Treatment Note     Name: Nabil Alfonso  Clinic Number: [id number]    Therapy Diagnosis:   Encounter Diagnoses   Name Primary?    Decreased strength, endurance, and mobility Yes    Postural imbalance      Physician: Evaristo Lopez MD    Visit Date: 3/1/2023    Physician: Evaristo Lopez MD     Physician Orders: PT Eval and Treat   Medical Diagnosis from Referral: M25.561 (ICD-10-CM) - Right knee pain  Evaluation Date: 2/15/2023  Authorization Period Expiration: 12/31/2023  Plan of Care Expiration: 5/15/23  Progress Note Due: 3/15/23  Visit # / Visits authorized: 1/ 20  FOTO: 1/5     Precautions: Standard and HTN     PTA Visit #: 1/5     Time In: 12:30  Time Out: 1:25  Total Billable Time: 55 minutes    SUBJECTIVE     Pt reports: "I am hurting, the exercises seem to really irritate that nerve. I am trying to push through but I have to work a lot so I stopped doing the exercises because of the pain."  He was compliant with home exercise program.  Response to previous treatment: first after eval  Functional change: more mobility    Pain: 7/10  Location: right knee      OBJECTIVE     Objective Measures updated at progress report unless specified.     Treatment     Nabil received the treatments listed below:      therapeutic exercises to develop strength, ROM, and core stabilization for 40 minutes including:  Seated edge of mat sciatic nerve glide 15x Right  Bridges 5" 2x10  Straight leg raise Right 2x10   +Quad set 2x10  +Upright supine nerve glide with towel behind knee x10  +Standing hip abduction x10  +Standing heel raises x10  +Standing hamstring curl kicks x10        manual therapy techniques: Joint mobilizations were applied to the: lumbar for 15 minutes, including:     Side lying lumbar gapping Right   Desensitization along lateral distal R lower extremity  Patellar mobs    Patient Education and Home Exercises     Home Exercises Provided and " Patient Education Provided     Education provided:   - Home exercise program  - Plan of Care     Written Home Exercises Provided: yes. Exercises were reviewed and Nabil was able to demonstrate them prior to the end of the session.  Nabil demonstrated good  understanding of the education provided. See EMR under Patient Instructions for exercises provided during therapy sessions.    ASSESSMENT     Patient presents to therapy with continued nerve pain and sensitivity down right lower extremity. Patient states the HEP activities (bridge, SLR, sciatic nerve glides) was causing a lot of pain so he stopped. Education and discussion completed to address patient concerns and explain nerve condition. Patient demonstrates good understanding of education provided. Manual techniques completed to desensitize lower extremity and increase patient's tolerance. Trialed some standing exercises today to reduce painful symptoms and patient seems to tolerate better at present. Plan to assess how patient tolerates this session and progress as fit.     Nabil Is progressing well towards his goals.   Pt prognosis is Good.     Pt will continue to benefit from skilled outpatient physical therapy to address the deficits listed in the problem list box on initial evaluation, provide pt/family education and to maximize pt's level of independence in the home and community environment.     Pt's spiritual, cultural and educational needs considered and pt agreeable to plan of care and goals.     Anticipated barriers to physical therapy: chronicity of condition, scheduling     Goals:    Short Term GOALS: 6 weeks. Pt agrees with goals set.  1. Patient demonstrates independence with HEP.   2. Patient will report pain of </=6/10 at worst, on 0-10 pain scale, with all activity  3. Patient demonstrates ability to perform light activities around his home with little to no difficulty  4. Patient demonstrates ability to stand for 1 hour with little to no  difficulty to improve tolerance to work related tasks  5. Patient demonstrates ability to lift and carry groceries with little to no difficulty to improve tolerance to functional tasks pain free.      Long Term GOALS: 12 weeks. Pt agrees with goals set.  1. Patient demonstrates ability to squat with no pain to improve tolerance to functional tasks pain free.   2. Patient demonstrates increased strength BLE's to 4+/5 or greater to improve tolerance to functional activities pain free.   3. Patient demonstrates improved overall function per FOTO Knee Survey to 40% Limitation or less.   4. Patient will report pain of </=3/10 at worst, on 0-10 pain scale, with all activity  5. Patient demonstrates ability to navigate 1 flight of stairs with no difficulty to navigate home environment     PLAN     Plan to increase strength and functional activity tolerance of right lower extremity.     Isamar Huff, PTA

## 2023-03-06 ENCOUNTER — CLINICAL SUPPORT (OUTPATIENT)
Dept: REHABILITATION | Facility: HOSPITAL | Age: 43
End: 2023-03-06
Payer: MEDICAID

## 2023-03-06 DIAGNOSIS — R53.1 DECREASED STRENGTH, ENDURANCE, AND MOBILITY: Primary | ICD-10-CM

## 2023-03-06 DIAGNOSIS — R68.89 DECREASED STRENGTH, ENDURANCE, AND MOBILITY: Primary | ICD-10-CM

## 2023-03-06 DIAGNOSIS — Z74.09 DECREASED STRENGTH, ENDURANCE, AND MOBILITY: Primary | ICD-10-CM

## 2023-03-06 DIAGNOSIS — R29.3 POSTURAL IMBALANCE: ICD-10-CM

## 2023-03-06 PROCEDURE — 97110 THERAPEUTIC EXERCISES: CPT | Mod: PN

## 2023-03-06 PROCEDURE — 97140 MANUAL THERAPY 1/> REGIONS: CPT | Mod: PN

## 2023-03-06 NOTE — PROGRESS NOTES
"[facility]   Physical Therapy Treatment Note     Name: Nabil Alfonso  Clinic Number: [id number]    Therapy Diagnosis:   Encounter Diagnoses   Name Primary?    Decreased strength, endurance, and mobility Yes    Postural imbalance        Physician: Evaristo Lopez MD    Visit Date: 3/6/2023       Physician Orders: PT Eval and Treat   Medical Diagnosis from Referral: M25.561 (ICD-10-CM) - Right knee pain  Evaluation Date: 2/15/2023  Authorization Period Expiration: 12/31/2023  Plan of Care Expiration: 5/15/23  Progress Note Due: 3/15/23  Visit # / Visits authorized: 2/ 20 (+eval)  FOTO: 1/5     Precautions: Standard and HTN     PTA Visit #: 0/5     Time In: 11:45  Time Out: 12:45  Total Billable Time: 40 minutes 1:1 c/ PT     SUBJECTIVE     Pt reports: He was in a wedding and wearing the hard dress shoes hurt his leg. He is feeling a little better today though  He was compliant with home exercise program.  Response to previous treatment: good  Functional change: more mobility    Pain: 7/10  Location: right knee      OBJECTIVE     Objective Measures updated at progress report unless specified.     3/6/23:    Slump: Negative Right   Sural bias: positive Right   Straight leg raise: Negative Right     Treatment     Nabil received the treatments listed below:      therapeutic exercises to develop strength, ROM, and core stabilization for 40 minutes including:  +Nustep 8'     Bridges 5" 2x10  Straight leg raise Right 2x10   Quad set 5" hold 3x10  +short arc quad 5" hold 3x10     supine nerve glide with towel behind knee x15 Right   side lying hip abduction 2x10  Standing heel raises x10  Standing hamstring curl kicks 2x10        manual therapy techniques: Joint mobilizations were applied to the: lumbar for 20 minutes, including:     Side lying lumbar gapping Right   Desensitization along lateral distal R lower extremity  Tibiofemoral joint mobs for internal rotation grade ii-iii  Patellar " mobs    Patient Education and Home Exercises     Home Exercises Provided and Patient Education Provided     Education provided:   - Home exercise program  - Plan of Care     Written Home Exercises Provided: yes. Exercises were reviewed and Nabil was able to demonstrate them prior to the end of the session.  Nabil demonstrated good  understanding of the education provided. See EMR under Patient Instructions for exercises provided during therapy sessions.    ASSESSMENT     Patient arrived to today's session with high levels of pain of right lower extremity. PT performed sural bias straight leg raise and was able to reproduce patient symptoms. Tibiofemoral joint mobs were performed with improvements in symptoms noted with sural straight leg raise after. He was then able to complete all exercises without complaint of pain. Weakness noted in quad with short arc quad and straight leg raise. He is most limited with standing activities due to pain in bilateral ankles from past injuries. He reported decrease in pain to 5/10 at end of treatment session.     Nabil Is progressing well towards his goals.   Pt prognosis is Good.     Pt will continue to benefit from skilled outpatient physical therapy to address the deficits listed in the problem list box on initial evaluation, provide pt/family education and to maximize pt's level of independence in the home and community environment.     Pt's spiritual, cultural and educational needs considered and pt agreeable to plan of care and goals.     Anticipated barriers to physical therapy: chronicity of condition, scheduling     Goals:    Short Term GOALS: 6 weeks. Pt agrees with goals set.  1. Patient demonstrates independence with HEP.   2. Patient will report pain of </=6/10 at worst, on 0-10 pain scale, with all activity  3. Patient demonstrates ability to perform light activities around his home with little to no difficulty  4. Patient demonstrates ability to stand for 1 hour with  little to no difficulty to improve tolerance to work related tasks  5. Patient demonstrates ability to lift and carry groceries with little to no difficulty to improve tolerance to functional tasks pain free.      Long Term GOALS: 12 weeks. Pt agrees with goals set.  1. Patient demonstrates ability to squat with no pain to improve tolerance to functional tasks pain free.   2. Patient demonstrates increased strength BLE's to 4+/5 or greater to improve tolerance to functional activities pain free.   3. Patient demonstrates improved overall function per FOTO Knee Survey to 40% Limitation or less.   4. Patient will report pain of </=3/10 at worst, on 0-10 pain scale, with all activity  5. Patient demonstrates ability to navigate 1 flight of stairs with no difficulty to navigate home environment     PLAN     Plan to increase strength and functional activity tolerance of right lower extremity.     Dior Mcnally, PT,DPT  03/06/2023

## 2023-03-13 ENCOUNTER — CLINICAL SUPPORT (OUTPATIENT)
Dept: REHABILITATION | Facility: HOSPITAL | Age: 43
End: 2023-03-13
Payer: MEDICAID

## 2023-03-13 DIAGNOSIS — R53.1 DECREASED STRENGTH, ENDURANCE, AND MOBILITY: Primary | ICD-10-CM

## 2023-03-13 DIAGNOSIS — R29.3 POSTURAL IMBALANCE: ICD-10-CM

## 2023-03-13 DIAGNOSIS — Z74.09 DECREASED STRENGTH, ENDURANCE, AND MOBILITY: Primary | ICD-10-CM

## 2023-03-13 DIAGNOSIS — R68.89 DECREASED STRENGTH, ENDURANCE, AND MOBILITY: Primary | ICD-10-CM

## 2023-03-13 PROCEDURE — 97110 THERAPEUTIC EXERCISES: CPT | Mod: PN

## 2023-03-13 NOTE — PROGRESS NOTES
"[facility]   Physical Therapy Treatment Note     Name: Nabil Alfonso  Clinic Number: [id number]    Therapy Diagnosis:   Encounter Diagnoses   Name Primary?    Decreased strength, endurance, and mobility Yes    Postural imbalance          Physician: Evaristo Lopez MD    Visit Date: 3/13/2023       Physician Orders: PT Eval and Treat   Medical Diagnosis from Referral: M25.561 (ICD-10-CM) - Right knee pain  Evaluation Date: 2/15/2023  Authorization Period Expiration: 12/31/2023  Plan of Care Expiration: 5/15/23  Progress Note Due: 3/15/23  Visit # / Visits authorized: 3/ 20 (+eval)  FOTO: 1/5     Precautions: Standard and HTN     PTA Visit #: 0/5     Time In: 1:31pm  Time Out: 2:27pm  Total Billable Time: 56 minutes 1:1 c/ PT (4 TE MEDICAID)    SUBJECTIVE     Pt reports: He did not really have any pain after his last visit and just noticed that it returned this morning. Overall, he has been feeling much better.   He was compliant with home exercise program.  Response to previous treatment: good  Functional change: more mobility    Pain: 3-4/10  Location: right knee      OBJECTIVE     Objective Measures updated at progress report unless specified.       Treatment     Nabil received the treatments listed below:      therapeutic exercises to develop strength, ROM, and core stabilization for 20 minutes including:    Nustep 8'   side lying hip abduction 2x10ea  Bridges 5" 2x10    Nabil participated in neuromuscular re-education activities to improve: Coordination, Posture, and motor control for 26 minutes. The following activities were included:    supine sural nerve glide with towel behind knee x15 Right   Straight leg raise Right 2x10   Quad set 5" hold 3x10  short arc quad 5" hold 3x10 with manual medial patellar glide by PT     manual therapy techniques: Joint mobilizations were applied to the: lumbar for 10 minutes, including:     Grade ii-iii fibular mobs for neural pain  Patellar " mobs    Patient Education and Home Exercises     Home Exercises Provided and Patient Education Provided     Education provided:   - Home exercise program  - Plan of Care     Written Home Exercises Provided: yes. Exercises were reviewed and Nabil was able to demonstrate them prior to the end of the session.  Nabil demonstrated good  understanding of the education provided. See EMR under Patient Instructions for exercises provided during therapy sessions.    ASSESSMENT     Patient continues to respond well to fibular joint mobs for nerve entrapment. Popping sensation noted with short arc quad that improved with manual medial glide of patella indicating mal tracking of Right knee. He was challenged with all exercises today due to fatigue.     Nabil Is progressing well towards his goals.   Pt prognosis is Good.     Pt will continue to benefit from skilled outpatient physical therapy to address the deficits listed in the problem list box on initial evaluation, provide pt/family education and to maximize pt's level of independence in the home and community environment.     Pt's spiritual, cultural and educational needs considered and pt agreeable to plan of care and goals.     Anticipated barriers to physical therapy: chronicity of condition, scheduling     Goals:    Short Term GOALS: 6 weeks. Pt agrees with goals set.  1. Patient demonstrates independence with HEP.   2. Patient will report pain of </=6/10 at worst, on 0-10 pain scale, with all activity  3. Patient demonstrates ability to perform light activities around his home with little to no difficulty  4. Patient demonstrates ability to stand for 1 hour with little to no difficulty to improve tolerance to work related tasks  5. Patient demonstrates ability to lift and carry groceries with little to no difficulty to improve tolerance to functional tasks pain free.      Long Term GOALS: 12 weeks. Pt agrees with goals set.  1. Patient demonstrates ability to squat with no  pain to improve tolerance to functional tasks pain free.   2. Patient demonstrates increased strength BLE's to 4+/5 or greater to improve tolerance to functional activities pain free.   3. Patient demonstrates improved overall function per FOTO Knee Survey to 40% Limitation or less.   4. Patient will report pain of </=3/10 at worst, on 0-10 pain scale, with all activity  5. Patient demonstrates ability to navigate 1 flight of stairs with no difficulty to navigate home environment     PLAN     Plan to increase strength and functional activity tolerance of right lower extremity.     Dior Mcnally, PT,DPT  03/13/2023

## 2023-03-15 ENCOUNTER — CLINICAL SUPPORT (OUTPATIENT)
Dept: REHABILITATION | Facility: HOSPITAL | Age: 43
End: 2023-03-15
Payer: MEDICAID

## 2023-03-15 DIAGNOSIS — R29.3 POSTURAL IMBALANCE: ICD-10-CM

## 2023-03-15 DIAGNOSIS — R53.1 DECREASED STRENGTH, ENDURANCE, AND MOBILITY: Primary | ICD-10-CM

## 2023-03-15 DIAGNOSIS — R68.89 DECREASED STRENGTH, ENDURANCE, AND MOBILITY: Primary | ICD-10-CM

## 2023-03-15 DIAGNOSIS — Z74.09 DECREASED STRENGTH, ENDURANCE, AND MOBILITY: Primary | ICD-10-CM

## 2023-03-15 PROCEDURE — 97110 THERAPEUTIC EXERCISES: CPT | Mod: PN

## 2023-03-15 NOTE — PROGRESS NOTES
"[facility]   Physical Therapy Treatment Note     Name: Nabil Alfonso  Clinic Number: [id number]    Therapy Diagnosis:   Encounter Diagnoses   Name Primary?    Decreased strength, endurance, and mobility Yes    Postural imbalance            Physician: Evaristo Lopez MD    Visit Date: 3/15/2023       Physician Orders: PT Eval and Treat   Medical Diagnosis from Referral: M25.561 (ICD-10-CM) - Right knee pain  Evaluation Date: 2/15/2023  Authorization Period Expiration: 12/31/2023  Plan of Care Expiration: 5/15/23  Progress Note Due: 4/15/23  Visit # / Visits authorized: 4/ 20 (+eval)  FOTO: 1/5     Precautions: Standard and HTN     PTA Visit #: 0/5     Time In: 0925am  Time Out: 1025am  Total Billable Time: 60 minutes 1:1 c/ PT (4 TE MEDICAID)    SUBJECTIVE     Pt reports: His leg has been feeling loose. He does not have any of the nerve pain right now. At it's worst his pain has been about a 4/10 and he attributes it to when the temperature dropped  He was compliant with home exercise program.  Response to previous treatment: good  Functional change: more mobility    Pain: 0/10  Location: right knee      OBJECTIVE     Objective Measures updated at progress report unless specified.     Updated 3/15/23:  Strength: Knee    Left Right   Quadriceps 5/5 4/5   Hamstrings 5/5 4/5         Strength: Hip    Left Right   Iliopsoas 4+/5 4/5   Glute Med 4/5 4/5    Hip IR 5/5 4/5    Hip ER 5/5 4/5    Hip Ext 4+/5 4/5    Ankle PF 4+/5 4+/5   Ankle DF 4+/5 4+/5      Slump:Negative     Straight leg raise: negative   Sural bias: negative  Forced knee flexion: negative  Forced knee extension: negative  Patellar grind test :negative      Treatment     Nabil received the treatments listed below:      therapeutic exercises to develop strength, ROM, and core stabilization for 10 minutes including:    Nustep 8'   side lying hip abduction 2x10ea  Bridges 5" 2x10  +long arc quad with green theraband focus on " "eccentric control 3x10    Nabil participated in neuromuscular re-education activities to improve: Coordination, Posture, and motor control for 30 minutes. The following activities were included:    supine sural nerve glide with towel behind knee x15 Right   Straight leg raise Right 3x10   Quad set 5" hold 3x10  short arc quad 5" hold 3x10 with manual medial patellar glide by PT   +Iso hold of knee extension with contralateral HS curl 2x10 ea    manual therapy techniques: Joint mobilizations were applied to the: lumbar for 20 minutes, including:    reassessment  Grade ii-iii fibular mobs for neural pain  Patellar mobs    Patient Education and Home Exercises     Home Exercises Provided and Patient Education Provided     Education provided:   - Home exercise program  - Plan of Care     Written Home Exercises Provided: yes. Exercises were reviewed and Nabil was able to demonstrate them prior to the end of the session.  Nabil demonstrated good  understanding of the education provided. See EMR under Patient Instructions for exercises provided during therapy sessions.    ASSESSMENT     Patient was reassessed today and displayed improvements in strength, knee pain, and radicular symptoms. PT progression exercises to continue to strengthen right lower extremity. He continues to report popping/clicking with short arc quad during knee extension that improves with medial patellar glide performed by PT. He left treatment session reporting feeling better.     Nabil Is progressing well towards his goals.   Pt prognosis is Good.     Pt will continue to benefit from skilled outpatient physical therapy to address the deficits listed in the problem list box on initial evaluation, provide pt/family education and to maximize pt's level of independence in the home and community environment.     Pt's spiritual, cultural and educational needs considered and pt agreeable to plan of care and goals.     Anticipated barriers to physical therapy: " chronicity of condition, scheduling     Goals:    Short Term GOALS: 6 weeks. Pt agrees with goals set.  1. Patient demonstrates independence with HEP. met  2. Patient will report pain of </=6/10 at worst, on 0-10 pain scale, with all activity met  3. Patient demonstrates ability to perform light activities around his home with little to no difficulty  4. Patient demonstrates ability to stand for 1 hour with little to no difficulty to improve tolerance to work related tasks  5. Patient demonstrates ability to lift and carry groceries with little to no difficulty to improve tolerance to functional tasks pain free.      Long Term GOALS: 12 weeks. Pt agrees with goals set.  1. Patient demonstrates ability to squat with no pain to improve tolerance to functional tasks pain free.   2. Patient demonstrates increased strength BLE's to 4+/5 or greater to improve tolerance to functional activities pain free.   3. Patient demonstrates improved overall function per FOTO Knee Survey to 40% Limitation or less.   4. Patient will report pain of </=3/10 at worst, on 0-10 pain scale, with all activity  5. Patient demonstrates ability to navigate 1 flight of stairs with no difficulty to navigate home environment     PLAN     Plan to increase strength and functional activity tolerance of right lower extremity.     Dior Mcnally, PT,DPT  03/15/2023

## 2023-03-20 ENCOUNTER — CLINICAL SUPPORT (OUTPATIENT)
Dept: REHABILITATION | Facility: HOSPITAL | Age: 43
End: 2023-03-20
Payer: MEDICAID

## 2023-03-20 DIAGNOSIS — R53.1 DECREASED STRENGTH, ENDURANCE, AND MOBILITY: Primary | ICD-10-CM

## 2023-03-20 DIAGNOSIS — R29.3 POSTURAL IMBALANCE: ICD-10-CM

## 2023-03-20 DIAGNOSIS — Z74.09 DECREASED STRENGTH, ENDURANCE, AND MOBILITY: Primary | ICD-10-CM

## 2023-03-20 DIAGNOSIS — R68.89 DECREASED STRENGTH, ENDURANCE, AND MOBILITY: Primary | ICD-10-CM

## 2023-03-20 PROCEDURE — 97110 THERAPEUTIC EXERCISES: CPT | Mod: PN,CQ

## 2023-03-20 NOTE — PROGRESS NOTES
"[facility]   Physical Therapy Treatment Note     Name: Nabil Alfonso  Clinic Number: [id number]    Therapy Diagnosis:   Encounter Diagnoses   Name Primary?    Decreased strength, endurance, and mobility Yes    Postural imbalance            Physician: Evaristo Lopez MD    Visit Date: 3/20/2023       Physician Orders: PT Eval and Treat   Medical Diagnosis from Referral: M25.561 (ICD-10-CM) - Right knee pain  Evaluation Date: 2/15/2023  Authorization Period Expiration: 12/31/2023  Plan of Care Expiration: 5/15/23  Progress Note Due: 4/15/23  Visit # / Visits authorized: 5/ 20 (+eval)  FOTO: 1/5     Precautions: Standard and HTN     PTA Visit #: 1/5     Time In: 10:45  Time Out: 11:45  Total Billable Time: 60 minutes 1:1  (4 TE MEDICAID)    SUBJECTIVE     Pt reports: "I worked from 4:30 until 10:30 this weekend and was feeling really good, but I think I overdid it. I am hurting today and just feel fatigued."  He was compliant with home exercise program.  Response to previous treatment: good  Functional change: more mobility    Pain: 0/10  Location: right knee      OBJECTIVE     Objective Measures updated at progress report unless specified.     Updated 3/15/23    Treatment     Nbail received the treatments listed below:      therapeutic exercises to develop strength, ROM, and core stabilization for 30 minutes including:    Nustep 8'   side lying hip abduction 2x10ea  Bridges 5" 2x10  +long arc quad with green theraband focus on eccentric control 3x10  Hamstring stretch 3x30"    Nabil participated in neuromuscular re-education activities to improve: Coordination, Posture, and motor control for 30 minutes. The following activities were included:    supine sural nerve glide with towel behind knee x15 Right   Straight leg raise Right 3x10   Quad set 5" hold 3x10  short arc quad 5" hold 3x10 with manual medial patellar glide by PT   +Iso hold of knee extension with contralateral HS curl 2x10 " ea    manual therapy techniques: Joint mobilizations were applied to the: lumbar for 00 minutes, including:    Grade ii-iii fibular mobs for neural pain  Patellar mobs    Patient Education and Home Exercises     Home Exercises Provided and Patient Education Provided     Education provided:   - Home exercise program  - Plan of Care     Written Home Exercises Provided: yes. Exercises were reviewed and Nabil was able to demonstrate them prior to the end of the session.  Nabil demonstrated good  understanding of the education provided. See EMR under Patient Instructions for exercises provided during therapy sessions.    ASSESSMENT     Patient arrives with increased fatigue and soreness today. Patient states he was having some cramps over the weekend in his calf and hamstring. Continued with strengthening and exercises to increase nerve mobility. Patient continues to present with muscle fatigue throughout session. Completed static hamstring stretch and educated patient on techniques to reduce cramps.     Nabil Is progressing well towards his goals.   Pt prognosis is Good.     Pt will continue to benefit from skilled outpatient physical therapy to address the deficits listed in the problem list box on initial evaluation, provide pt/family education and to maximize pt's level of independence in the home and community environment.     Pt's spiritual, cultural and educational needs considered and pt agreeable to plan of care and goals.     Anticipated barriers to physical therapy: chronicity of condition, scheduling     Goals:    Short Term GOALS: 6 weeks. Pt agrees with goals set.  1. Patient demonstrates independence with HEP. met  2. Patient will report pain of </=6/10 at worst, on 0-10 pain scale, with all activity met  3. Patient demonstrates ability to perform light activities around his home with little to no difficulty  4. Patient demonstrates ability to stand for 1 hour with little to no difficulty to improve tolerance  to work related tasks  5. Patient demonstrates ability to lift and carry groceries with little to no difficulty to improve tolerance to functional tasks pain free.      Long Term GOALS: 12 weeks. Pt agrees with goals set.  1. Patient demonstrates ability to squat with no pain to improve tolerance to functional tasks pain free.   2. Patient demonstrates increased strength BLE's to 4+/5 or greater to improve tolerance to functional activities pain free.   3. Patient demonstrates improved overall function per FOTO Knee Survey to 40% Limitation or less.   4. Patient will report pain of </=3/10 at worst, on 0-10 pain scale, with all activity  5. Patient demonstrates ability to navigate 1 flight of stairs with no difficulty to navigate home environment     PLAN     Plan to increase strength and functional activity tolerance of right lower extremity.     Isamar Huff, PTA  03/20/2023

## 2023-03-27 ENCOUNTER — CLINICAL SUPPORT (OUTPATIENT)
Dept: REHABILITATION | Facility: HOSPITAL | Age: 43
End: 2023-03-27
Payer: MEDICAID

## 2023-03-27 DIAGNOSIS — R29.3 POSTURAL IMBALANCE: ICD-10-CM

## 2023-03-27 DIAGNOSIS — R53.1 DECREASED STRENGTH, ENDURANCE, AND MOBILITY: Primary | ICD-10-CM

## 2023-03-27 DIAGNOSIS — R68.89 DECREASED STRENGTH, ENDURANCE, AND MOBILITY: Primary | ICD-10-CM

## 2023-03-27 DIAGNOSIS — Z74.09 DECREASED STRENGTH, ENDURANCE, AND MOBILITY: Primary | ICD-10-CM

## 2023-03-27 PROCEDURE — 97110 THERAPEUTIC EXERCISES: CPT | Mod: PN,CQ
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
## 2023-04-03 ENCOUNTER — CLINICAL SUPPORT (OUTPATIENT)
Dept: REHABILITATION | Facility: HOSPITAL | Age: 43
End: 2023-04-03
Payer: MEDICAID

## 2023-04-03 DIAGNOSIS — R29.3 POSTURAL IMBALANCE: ICD-10-CM

## 2023-04-03 DIAGNOSIS — Z74.09 DECREASED STRENGTH, ENDURANCE, AND MOBILITY: Primary | ICD-10-CM

## 2023-04-03 DIAGNOSIS — R53.1 DECREASED STRENGTH, ENDURANCE, AND MOBILITY: Primary | ICD-10-CM

## 2023-04-03 DIAGNOSIS — R68.89 DECREASED STRENGTH, ENDURANCE, AND MOBILITY: Primary | ICD-10-CM

## 2023-04-03 PROCEDURE — 97110 THERAPEUTIC EXERCISES: CPT | Mod: PN

## 2023-04-03 NOTE — PROGRESS NOTES
"[facility]   Physical Therapy Treatment Note     Name: Nabil Alfonso  Clinic Number: [id number]    Therapy Diagnosis:   Encounter Diagnoses   Name Primary?    Decreased strength, endurance, and mobility Yes    Postural imbalance        Physician: Evaristo Lopez MD    Visit Date: 4/3/2023       Physician Orders: PT Eval and Treat   Medical Diagnosis from Referral: M25.561 (ICD-10-CM) - Right knee pain  Evaluation Date: 2/15/2023  Authorization Period Expiration: 12/31/2023  Plan of Care Expiration: 5/15/23  Progress Note Due: 4/15/23  Visit # / Visits authorized: 7/ 20 (+eval)  FOTO: 1/5     Precautions: Standard and HTN     PTA Visit #: 0/5     Time In: 1045  Time Out: 1143  Total Billable Time: 30 minutes 1:1  (2 TE MEDICAID)    SUBJECTIVE     Pt reports: His leg is hurting today. He went to a concert. He feels like he has a shin split in his Right leg. 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 The knee still has some of the nerve pain but it is not as much anymore.   He was compliant with home exercise program.  Response to previous treatment: good  Functional change: more mobility    Pain: 8/10  Location: right leg (shin splint)    OBJECTIVE     Objective Measures updated at progress report unless specified.     Updated 3/15/23    Treatment     Nabil received the treatments listed below:      therapeutic exercises to develop strength, ROM, and core stabilization for 15 minutes including:    Nustep 8'   clamshell green band 2x10   Bridges + green band 5" 2x10    Nabil participated in neuromuscular re-education activities to improve: Coordination, Posture, and motor control for 35 minutes. The following activities were included:    supine sural nerve glide with towel behind knee x15 Right   Straight leg raise Right 3x10     short arc quad +2# 5" hold 2x10 with manual medial patellar glide by PT   Iso hold of knee extension with contralateral HS " curl 2x10 ea    manual therapy techniques: Joint mobilizations were applied to the: lumbar for 10 minutes, including:    Grade ii-iii fibular mobs for neural pain  STM to Right anterior tibialis  Patellar mobs    Patient Education and Home Exercises     Home Exercises Provided and Patient Education Provided     Education provided:   - Home exercise program  - Plan of Care     Written Home Exercises Provided: yes. Exercises were reviewed and Nabil was able to demonstrate them prior to the end of the session.  Nabil demonstrated good  understanding of the education provided. See EMR under Patient Instructions for exercises provided during therapy sessions.    ASSESSMENT     Patient arrived to therapy with increase in pain in anterior right leg. PT performed STM to anterior tibialis muscle belly with tenderness. PT progressed hip strengthening with appropriate level of fatigue achieved. Assess pt tolerance at next visit.     Nabil Is progressing well towards his goals.   Pt prognosis is Good.     Pt will continue to benefit from skilled outpatient physical therapy to address the deficits listed in the problem list box on initial evaluation, provide pt/family education and to maximize pt's level of independence in the home and community environment.     Pt's spiritual, cultural and educational needs considered and pt agreeable to plan of care and goals.     Anticipated barriers to physical therapy: chronicity of condition, scheduling     Goals:    Short Term GOALS: 6 weeks. Pt agrees with goals set.  1. Patient demonstrates independence with HEP. met  2. Patient will report pain of </=6/10 at worst, on 0-10 pain scale, with all activity met  3. Patient demonstrates ability to perform light activities around his home with little to no difficulty  4. Patient demonstrates ability to stand for 1 hour with little to no difficulty to improve tolerance to work related tasks  5. Patient demonstrates ability to lift and carry  groceries with little to no difficulty to improve tolerance to functional tasks pain free.      Long Term GOALS: 12 weeks. Pt agrees with goals set.  1. Patient demonstrates ability to squat with no pain to improve tolerance to functional tasks pain free.   2. Patient demonstrates increased strength BLE's to 4+/5 or greater to improve tolerance to functional activities pain free.   3. Patient demonstrates improved overall function per FOTO Knee Survey to 40% Limitation or less.   4. Patient will report pain of </=3/10 at worst, on 0-10 pain scale, with all activity  5. Patient demonstrates ability to navigate 1 flight of stairs with no difficulty to navigate home environment     PLAN     Plan to increase strength and functional activity tolerance of right lower extremity.     Dior Mcnally, PT,DPT  04/03/2023

## 2023-04-24 ENCOUNTER — CLINICAL SUPPORT (OUTPATIENT)
Dept: REHABILITATION | Facility: HOSPITAL | Age: 43
End: 2023-04-24
Payer: MEDICAID

## 2023-04-24 DIAGNOSIS — R29.3 POSTURAL IMBALANCE: ICD-10-CM

## 2023-04-24 DIAGNOSIS — Z74.09 DECREASED STRENGTH, ENDURANCE, AND MOBILITY: Primary | ICD-10-CM

## 2023-04-24 DIAGNOSIS — R68.89 DECREASED STRENGTH, ENDURANCE, AND MOBILITY: Primary | ICD-10-CM

## 2023-04-24 DIAGNOSIS — R53.1 DECREASED STRENGTH, ENDURANCE, AND MOBILITY: Primary | ICD-10-CM

## 2023-04-24 PROCEDURE — 97110 THERAPEUTIC EXERCISES: CPT | Mod: PN

## 2023-04-24 NOTE — PROGRESS NOTES
[facility]   Physical Therapy Treatment Note     Name: Nabil Alfonso  Clinic Number: [id number]    Therapy Diagnosis:   Encounter Diagnoses   Name Primary?    Decreased strength, endurance, and mobility Yes    Postural imbalance        Physician: Evaristo Lopez MD    Visit Date: 4/24/2023       Physician Orders: PT Eval and Treat   Medical Diagnosis from Referral: M25.561 (ICD-10-CM) - Right knee pain  Evaluation Date: 2/15/2023  Authorization Period Expiration: 12/31/2023  Plan of Care Expiration: 5/15/23  Progress Note Due: 5/15/23  Visit # / Visits authorized: 8/ 20 (+eval)  FOTO: 1/5     Precautions: Standard and HTN     PTA Visit #: 0/5     Time In: 1:45pm  Time Out: 2:59pm  Total Billable Time: 74 minutes  (4 TE MEDICAID)    SUBJECTIVE     Pt reports:He is feeling great today. He has been able to work without pain. He just cut his hours down to 8 hours but he takes his lunch break and other breaks as needed. He is still doing his Home exercise program. He has also returned to walk and running. He has some pain when he tries to squat for prolonged periods of time and then return to standing as well as pain with hopping on single leg.   He was compliant with home exercise program.  Response to previous treatment: good  Functional change: more mobility    Pain: 0/10  Location: right leg    OBJECTIVE     Objective Measures updated at progress report unless specified.     Updated 4/24/23    Strength: Knee    Left Right   Quadriceps 5/5 5/5   Hamstrings 5/5 5/5         Strength: Hip    Left Right   Iliopsoas 5/5 5/5   Glute Med 4+/5 5/5    Hip IR 5/5 5/5    Hip ER 5/5 5/5    Hip Ext 5/5 5/5    Ankle PF 4+/5 4+/5   Ankle DF 4+/5 4+/5      CMS Impairment/Limitation/Restriction for FOTO Knee Survey    Therapist reviewed FOTO scores for Nabil Alfonso on 4/24/2023.   FOTO documents entered into [employer] - see Media section.    Limitation Score: 9%     Functional squat: quad dominant without  "pain    Negative straight leg raise with tibial and sural bias    Treatment     Nabil received the treatments listed below:      therapeutic exercises to develop strength, ROM, and core stabilization for 64 minutes including:    Nustep 8' +lvl3  clamshell blue band 3x10   Bridges + blue band 5" 3x10  +lateral walks green theraband @ knees 3 laps on tape  +monster walk green theraband @ knees 3 laps on tape  +shuttle squat 3 bands 3x10   +single leg shuttle 2 bands 3x10 ea         manual therapy techniques: Joint mobilizations were applied to the: lumbar for 10 minutes, including:    reassessment      Patient Education and Home Exercises     Home Exercises Provided and Patient Education Provided     Education provided:   - reassessment findings    Written Home Exercises Provided: yes. Exercises were reviewed and Nabil was able to demonstrate them prior to the end of the session.  Nabil demonstrated good  understanding of the education provided. See EMR under Patient Instructions for exercises provided during therapy sessions.    ASSESSMENT     Patient was reassessed today and has displayed significant improvement in function, pain, strength, and overall mobility. He continues to have some pain with returning to standing from prolonged squat position and jumping.     Nabil Is progressing well towards his goals.   Pt prognosis is Good.     Pt will continue to benefit from skilled outpatient physical therapy to address the deficits listed in the problem list box on initial evaluation, provide pt/family education and to maximize pt's level of independence in the home and community environment.     Pt's spiritual, cultural and educational needs considered and pt agreeable to plan of care and goals.     Anticipated barriers to physical therapy: chronicity of condition, scheduling     Goals:    Short Term GOALS: 6 weeks. Pt agrees with goals set.  1. Patient demonstrates independence with HEP. met  2. Patient will report pain " of </=6/10 at worst, on 0-10 pain scale, with all activity met  3. Patient demonstrates ability to perform light activities around his home with little to no difficulty met  4. Patient demonstrates ability to stand for 1 hour with little to no difficulty to improve tolerance to work related tasks met  5. Patient demonstrates ability to lift and carry groceries with little to no difficulty to improve tolerance to functional tasks pain free. met     Long Term GOALS: 12 weeks. Pt agrees with goals set.  1. Patient demonstrates ability to squat with no pain to improve tolerance to functional tasks pain free. met  2. Patient demonstrates increased strength BLE's to 4+/5 or greater to improve tolerance to functional activities pain free. met  3. Patient demonstrates improved overall function per FOTO Knee Survey to 40% Limitation or less.  met  4. Patient will report pain of </=3/10 at worst, on 0-10 pain scale, with all activity. Not met  5. Patient demonstrates ability to navigate 1 flight of stairs with no difficulty to navigate home environment met    PLAN     Plan to increase strength and functional activity tolerance of right lower extremity.     Dior Mcnally, PT,DPT  04/24/2023

## 2023-05-01 ENCOUNTER — CLINICAL SUPPORT (OUTPATIENT)
Dept: REHABILITATION | Facility: HOSPITAL | Age: 43
End: 2023-05-01
Payer: MEDICAID

## 2023-05-01 DIAGNOSIS — R53.1 DECREASED STRENGTH, ENDURANCE, AND MOBILITY: Primary | ICD-10-CM

## 2023-05-01 DIAGNOSIS — Z74.09 DECREASED STRENGTH, ENDURANCE, AND MOBILITY: Primary | ICD-10-CM

## 2023-05-01 DIAGNOSIS — R68.89 DECREASED STRENGTH, ENDURANCE, AND MOBILITY: Primary | ICD-10-CM

## 2023-05-01 DIAGNOSIS — R29.3 POSTURAL IMBALANCE: ICD-10-CM

## 2023-05-01 PROCEDURE — 97110 THERAPEUTIC EXERCISES: CPT | Mod: PN

## 2023-05-01 NOTE — PROGRESS NOTES
[facility]   Physical Therapy Treatment/discharge Note     Name: Nabil Alfonso  Clinic Number: [id number]    Therapy Diagnosis:   Encounter Diagnoses   Name Primary?    Decreased strength, endurance, and mobility Yes    Postural imbalance          Physician: Evaristo Lopez MD    Visit Date: 5/1/2023       Physician Orders: PT Eval and Treat   Medical Diagnosis from Referral: M25.561 (ICD-10-CM) - Right knee pain  Evaluation Date: 2/15/2023  Authorization Period Expiration: 12/31/2023  Plan of Care Expiration: 5/15/23  Progress Note Due: 5/15/23  Visit # / Visits authorized: 9/ 20 (+eval)  FOTO: 1/5     Precautions: Standard and HTN     PTA Visit #: 0/5     Time In: 1045am  Time Out: 1143am  Total Billable Time: 58 minutes  (4 TE MEDICAID)    SUBJECTIVE     Pt reports:He returned to walking without any pain/difficulty. He did have some pain last night after he was sitting with his leg bent for a long period of time. He feels fine this morning though.   He was compliant with home exercise program.  Response to previous treatment: good  Functional change: more mobility    Pain: 0/10  Location: right leg    OBJECTIVE     Objective Measures updated at progress report unless specified.     Updated 4/24/23    Strength: Knee    Left Right   Quadriceps 5/5 5/5   Hamstrings 5/5 5/5         Strength: Hip    Left Right   Iliopsoas 5/5 5/5   Glute Med 4+/5 5/5    Hip IR 5/5 5/5    Hip ER 5/5 5/5    Hip Ext 5/5 5/5    Ankle PF 5/5 5/5   Ankle DF 5/5 5/5      CMS Impairment/Limitation/Restriction for FOTO Knee Survey    Therapist reviewed FOTO scores for Nabil Alfonso on 5/1/2023.   FOTO documents entered into [employer] ([employer] - see Media section.    Limitation Score: 14%         Treatment     Nabil received the treatments listed below:      therapeutic exercises to develop strength, ROM, and core stabilization for 43 minutes including:    Recumbent bike  8' lvl3  clamshell blue band 3x10   Bridges + blue band  "5" 3x10  lateral walks green theraband @ ankle 2 laps on tape  monster walk green theraband @ ankle 2 laps on tape  shuttle squat 3 bands 3x10   single leg shuttle 2 bands 3x10 ea       manual therapy techniques: Joint mobilizations were applied to the: lumbar for 15 minutes, including:    reassessment      Patient Education and Home Exercises     Home Exercises Provided and Patient Education Provided     Education provided:   - discharge    Written Home Exercises Provided: yes. Exercises were reviewed and Nabil was able to demonstrate them prior to the end of the session.  Nabil demonstrated good  understanding of the education provided. See EMR under Patient Instructions for exercises provided during therapy sessions.    ASSESSMENT     Patient was discharge with independent Home exercise program. He has met all STG and LTG and has returned to PLOF. Patient is agreeable to plan.     Nbail Is progressing well towards his goals.   Pt prognosis is Good.         Pt's spiritual, cultural and educational needs considered and pt agreeable to plan of care and goals.     Anticipated barriers to physical therapy: chronicity of condition, scheduling     Goals:    Short Term GOALS: 6 weeks. Pt agrees with goals set.  1. Patient demonstrates independence with HEP. met  2. Patient will report pain of </=6/10 at worst, on 0-10 pain scale, with all activity met  3. Patient demonstrates ability to perform light activities around his home with little to no difficulty met  4. Patient demonstrates ability to stand for 1 hour with little to no difficulty to improve tolerance to work related tasks met  5. Patient demonstrates ability to lift and carry groceries with little to no difficulty to improve tolerance to functional tasks pain free. met     Long Term GOALS: 12 weeks. Pt agrees with goals set.  1. Patient demonstrates ability to squat with no pain to improve tolerance to functional tasks pain free. met  2. Patient demonstrates " increased strength BLE's to 4+/5 or greater to improve tolerance to functional activities pain free. met  3. Patient demonstrates improved overall function per FOTO Knee Survey to 40% Limitation or less.  met  4. Patient will report pain of </=3/10 at worst, on 0-10 pain scale, with all activity. met  5. Patient demonstrates ability to navigate 1 flight of stairs with no difficulty to navigate home environment met    PLAN     Discharge with independent Home exercise program     Dior Mcnally, PT,DPT  05/01/2023

## 2023-11-18 ENCOUNTER — HOSPITAL ENCOUNTER (EMERGENCY)
Facility: HOSPITAL | Age: 43
Discharge: HOME | End: 2023-11-18
Attending: EMERGENCY MEDICINE
Payer: MEDICAID

## 2023-11-18 VITALS
BODY MASS INDEX: 34.06 KG/M2 | OXYGEN SATURATION: 99 % | HEART RATE: 91 BPM | SYSTOLIC BLOOD PRESSURE: 139 MMHG | RESPIRATION RATE: 19 BRPM | HEIGHT: 67 IN | DIASTOLIC BLOOD PRESSURE: 87 MMHG | TEMPERATURE: 99 F | WEIGHT: 217 LBS

## 2023-11-18 DIAGNOSIS — S39.012A STRAIN OF LUMBAR REGION, INITIAL ENCOUNTER: Primary | ICD-10-CM

## 2023-11-18 PROBLEM — I10 PRIMARY HYPERTENSION: Status: ACTIVE | Noted: 2020-07-27

## 2023-11-18 PROCEDURE — 96372 THER/PROPH/DIAG INJ SC/IM: CPT | Performed by: PHYSICIAN ASSISTANT

## 2023-11-18 PROCEDURE — 63600175 PHARM REV CODE 636 W HCPCS: Performed by: PHYSICIAN ASSISTANT

## 2023-11-18 PROCEDURE — 99284 EMERGENCY DEPT VISIT MOD MDM: CPT

## 2023-11-18 RX ORDER — KETOROLAC TROMETHAMINE 30 MG/ML
15 INJECTION, SOLUTION INTRAMUSCULAR; INTRAVENOUS
Status: COMPLETED | OUTPATIENT
Start: 2023-11-18 | End: 2023-11-18

## 2023-11-18 RX ORDER — ORPHENADRINE CITRATE 100 MG/1
100 TABLET, EXTENDED RELEASE ORAL 2 TIMES DAILY
Qty: 8 TABLET | Refills: 0 | Status: SHIPPED | OUTPATIENT
Start: 2023-11-18 | End: 2023-11-22

## 2023-11-18 RX ORDER — LIDOCAINE 50 MG/G
1 PATCH TOPICAL DAILY
Qty: 6 PATCH | Refills: 0 | Status: SHIPPED | OUTPATIENT
Start: 2023-11-18

## 2023-11-18 RX ORDER — MELOXICAM 7.5 MG/1
7.5 TABLET ORAL DAILY
Qty: 12 TABLET | Refills: 0 | Status: SHIPPED | OUTPATIENT
Start: 2023-11-18

## 2023-11-18 RX ADMIN — KETOROLAC TROMETHAMINE 15 MG: 30 INJECTION, SOLUTION INTRAMUSCULAR at 12:11

## 2023-11-18 NOTE — DISCHARGE INSTRUCTIONS
Thank you for coming to our Emergency Department today. It is important to remember that some problems or medical conditions are difficult to diagnose and may not be found or addressed during your Emergency Department visit.  These conditions often start with non-specific symptoms and can only be diagnosed on follow up visits with your primary care physician or specialist when the symptoms continue or change. Please remember that all medical conditions can change, and we cannot predict how you will be feeling tomorrow or the next day. Return to the ER with any questions/concerns, new/concerning symptoms, worsening or failure to improve.       Be sure to follow up with your primary care doctor and review all labs/imaging/tests that were performed during your ER visit with them. It is very common for us to identify non-emergent incidental findings which must be followed up with your primary care physician.  Some labs/imaging/tests may be outside of the normal range, and require non-emergent follow-up and/or further investigation/treatment/procedures/testing to help diagnose/exclude/prevent complications or other potentially serious medical conditions. Some abnormalities may not have been discussed or addressed during your ER visit.     An ER visit does not replace a primary care visit, and many screening tests or follow-up tests cannot be ordered by an ER doctor or performed by the ER. Some tests may even require pre-approval.    If you do not have a primary care doctor, you may contact the one listed on your discharge paperwork or you may also call the [facility] Appointment Desk at [phone] , or [address] [place] [address] at  [phone] to schedule an appointment, or establish care with a primary care doctor or even a specialist and to obtain information about local resources. It is important to your health that you have a primary care doctor.    Please take all medications as directed. We have done our best to select  a medication for you that will treat your condition however, all medications may potentially have side-effects and it is impossible to predict which medications may give you side-effects or what those side-effects (if any) those medications may give you.  If you feel that you are having a negative effect or side-effect of any medication you should stop taking those medications immediately and seek medical attention. If you feel that you are having a life-threatening reaction call 911.        Do not drive, swim, climb to height, take a bath, operate heavy machinery, drink alcohol or take potentially sedating medications, sign any legal documents or make any important decisions for 24 hours if you have received any pain medications, sedatives or mood altering drugs during your ER visit or within 24 hours of taking them if they have been prescribed to you.     You can find additional resources for Dentists, hearing aids, durable medical equipment, low cost pharmacies and other resources at [email or web address]

## 2023-11-18 NOTE — ED PROVIDER NOTES
Encounter Date: 11/18/2023       History     Chief Complaint   Patient presents with    Back Pain     Patient reports felt a pop to his right lower back on Wednesday now has pain and tingling into the right leg.      43-year-old male with no pertinent past medical history presents to the emergency department for acute onset atraumatic sharp and positional right mid back pain that occurred when he jumped into his bed Wednesday.  Notes that that same day he was cleaning up around the house.  Symptoms will occasionally radiate into his right buttock.  No history of prior back injury or sciatica.  No urinary symptoms or incontinence.  Denies abdominal pain, chest pain, shortness of breath, fever, nausea, and vomiting.  No medication prior to arrival.    The history is provided by the patient.     Review of patient's allergies indicates:  No Known Allergies  Past Medical History:   Diagnosis Date    Hypertension      Past Surgical History:   Procedure Laterality Date    ACHILLES TENDON SURGERY      BONE MARROW BIOPSY      HERNIA REPAIR       Family History   Problem Relation Age of Onset    Hypertension Mother     Hypertension Father      Social History     Tobacco Use    Smoking status: Former     Current packs/day: 0.50     Types: Cigarettes    Smokeless tobacco: Never   Substance Use Topics    Alcohol use: Yes     Comment: occasionally    Drug use: Not Currently     Types: Marijuana     Review of Systems   Constitutional:  Negative for fever.   Respiratory:  Negative for shortness of breath.    Cardiovascular:  Negative for chest pain.   Gastrointestinal:  Negative for abdominal pain, nausea and vomiting.   Musculoskeletal:  Positive for back pain. Negative for joint swelling and neck pain.   Skin:  Negative for color change and wound.   Neurological:  Negative for numbness.   All other systems reviewed and are negative.      Physical Exam     Initial Vitals [11/18/23 1101]   BP Pulse Resp Temp SpO2   134/85 85 20  98.6 °F (37 °C) 95 %      MAP       --         Physical Exam    Nursing note and vitals reviewed.  Constitutional: He appears well-developed and well-nourished. He is not diaphoretic. No distress.   HENT:   Head: Atraumatic.   Right Ear: External ear normal.   Left Ear: External ear normal.   Eyes: Conjunctivae are normal.   Neck: No tracheal deviation present.   Normal range of motion.  Cardiovascular:  Normal rate and regular rhythm.           Pulmonary/Chest: No accessory muscle usage or stridor. No tachypnea. No respiratory distress.   Musculoskeletal:      Cervical back: Normal range of motion.      Comments: Reproducible positional tenderness to the right upper lumbar musculature without midline tenderness or bony deformity.  No skin changes.  Spasming of upper lumbar musculature.  Negative straight leg raise.  Distal skin perfusion normal.  Ambulatory.     Neurological: He has normal strength. He displays no tremor. He displays no seizure activity. Coordination and gait normal.   Skin: Skin is intact. Capillary refill takes less than 2 seconds. No cyanosis.         ED Course   Procedures  Labs Reviewed - No data to display       Imaging Results              X-Ray Lumbar Spine Ap And Lateral (Final result)  Result time 11/18/23 12:13:02      Final result by Nicolasa Jacques MD (11/18/23 12:13:02)                   Impression:      Mild spondylosis, no acute process seen.      Electronically signed by: Nicolasa Jacques MD  Date:    11/18/2023  Time:    12:13               Narrative:    EXAMINATION:  XR LUMBAR SPINE AP AND LATERAL    CLINICAL HISTORY:  new onset acute RLE radiculopathy;    TECHNIQUE:  AP, lateral and spot images were performed of the lumbar spine.    COMPARISON:  None    FINDINGS:  Subtle dextrocurvature of the lumbar spine.    The vertebral body heights are well maintained.    The disc spaces are well maintained.    Small anterior osteophytes noted at L2 through L5.    No fracture, no  osseous lesions.    The sacroiliac joints appear symmetrical on the AP view.    The soft tissues appear normal.                                       Medications   ketorolac injection 15 mg (15 mg Intramuscular Given 11/18/23 1200)     Medical Decision Making  Myofascial strain with associated spasm.  Atraumatic.  Screening x-ray shows no bony instability or bony neoplastic process.  Subtle occasional radicular component likely due to spasming as opposed to herniated disc.  However, may benefit from MRI as an outpatient if symptoms persist.  No urinary symptoms.  Not consistent with cord compression.  I doubt epidural abscess.  Low suspicion for occult cardiopulmonary process today.  Ambulatory.    Amount and/or Complexity of Data Reviewed  Radiology: ordered.    Risk  Prescription drug management.                                   Clinical Impression:  Final diagnoses:  [S39.012A] Strain of lumbar region, initial encounter (Primary)          ED Disposition Condition    Discharge Stable          ED Prescriptions       Medication Sig Dispense Start Date End Date Auth. Provider    orphenadrine (NORFLEX) 100 mg tablet Take 1 tablet (100 mg total) by mouth 2 (two) times daily. for 4 days 8 tablet 11/18/2023 11/22/2023 Abdirahman Gregory PA-C    meloxicam (MOBIC) 7.5 MG tablet Take 1 tablet (7.5 mg total) by mouth once daily. 12 tablet 11/18/2023 -- Abdirahman Gregory PA-C    LIDOcaine (LIDODERM) 5 % Place 1 patch onto the skin once daily. Remove & Discard patch within 12 hours or as directed by MD. May use 4% formulation if more affordable for patient. 6 patch 11/18/2023 -- Abdirahman Gregory PA-C          Follow-up Information       Follow up With Specialties Details Why Contact Info    [facility] -  Schedule an appointment as soon as possible for a visit in 1 day For reevaluation, AND to establish primary if you don't have a  [address] [place] [address] [phone]      [facility] - Emergency  Dept Emergency Medicine Go to  If symptoms worsen or new symptoms develop [address] [facility] [place] [address] [phone]             Abdirahman Gregory PA-C  11/18/23 1226